# Patient Record
Sex: MALE | Race: ASIAN | Employment: OTHER | URBAN - METROPOLITAN AREA
[De-identification: names, ages, dates, MRNs, and addresses within clinical notes are randomized per-mention and may not be internally consistent; named-entity substitution may affect disease eponyms.]

---

## 2018-06-01 RX ORDER — CHOLECALCIFEROL TAB 125 MCG (5000 UNIT) 125 MCG
5000 TAB ORAL DAILY
COMMUNITY

## 2018-06-06 ENCOUNTER — ANESTHESIA EVENT (OUTPATIENT)
Dept: SURGERY | Age: 68
DRG: 614 | End: 2018-06-06
Payer: MEDICARE

## 2018-06-07 ENCOUNTER — HOSPITAL ENCOUNTER (INPATIENT)
Age: 68
LOS: 2 days | Discharge: HOME | DRG: 614 | End: 2018-06-09
Attending: UROLOGY | Admitting: UROLOGY
Payer: MEDICARE

## 2018-06-07 ENCOUNTER — ANESTHESIA (OUTPATIENT)
Dept: SURGERY | Age: 68
DRG: 614 | End: 2018-06-07
Payer: MEDICARE

## 2018-06-07 PROBLEM — R31.9 HEMATURIA: Status: ACTIVE | Noted: 2018-06-07

## 2018-06-07 PROBLEM — E26.9 HYPERALDOSTERONISM (HCC): Status: ACTIVE | Noted: 2018-06-07

## 2018-06-07 LAB
ABO + RH BLD: NORMAL
ANION GAP SERPL CALC-SCNC: 11 MMOL/L (ref 3–18)
BLOOD GROUP ANTIBODIES SERPL: NORMAL
BUN SERPL-MCNC: 38 MG/DL (ref 7–18)
BUN/CREAT SERPL: 16 (ref 12–20)
CALCIUM SERPL-MCNC: 8.3 MG/DL (ref 8.5–10.1)
CHLORIDE SERPL-SCNC: 111 MMOL/L (ref 100–108)
CO2 SERPL-SCNC: 22 MMOL/L (ref 21–32)
CREAT SERPL-MCNC: 2.32 MG/DL (ref 0.6–1.3)
ERYTHROCYTE [DISTWIDTH] IN BLOOD BY AUTOMATED COUNT: 14.7 % (ref 11.6–14.5)
GLUCOSE SERPL-MCNC: 146 MG/DL (ref 74–99)
HCT VFR BLD AUTO: 38.9 % (ref 36–48)
HGB BLD-MCNC: 13.3 G/DL (ref 13–16)
MCH RBC QN AUTO: 30 PG (ref 24–34)
MCHC RBC AUTO-ENTMCNC: 34.2 G/DL (ref 31–37)
MCV RBC AUTO: 87.8 FL (ref 74–97)
PLATELET # BLD AUTO: 228 K/UL (ref 135–420)
PMV BLD AUTO: 10.8 FL (ref 9.2–11.8)
POTASSIUM SERPL-SCNC: 3.7 MMOL/L (ref 3.5–5.5)
RBC # BLD AUTO: 4.43 M/UL (ref 4.7–5.5)
SODIUM SERPL-SCNC: 144 MMOL/L (ref 136–145)
SPECIMEN EXP DATE BLD: NORMAL
WBC # BLD AUTO: 13.5 K/UL (ref 4.6–13.2)

## 2018-06-07 PROCEDURE — 77030011640 HC PAD GRND REM COVD -A: Performed by: UROLOGY

## 2018-06-07 PROCEDURE — 74011250636 HC RX REV CODE- 250/636: Performed by: UROLOGY

## 2018-06-07 PROCEDURE — 77030002966 HC SUT PDS J&J -A: Performed by: UROLOGY

## 2018-06-07 PROCEDURE — 74011000250 HC RX REV CODE- 250: Performed by: UROLOGY

## 2018-06-07 PROCEDURE — 77030009848 HC PASSR SUT SET COOP -C: Performed by: UROLOGY

## 2018-06-07 PROCEDURE — 77030005401 HC CATH RAD ARRO -A: Performed by: ANESTHESIOLOGY

## 2018-06-07 PROCEDURE — 74011250637 HC RX REV CODE- 250/637: Performed by: UROLOGY

## 2018-06-07 PROCEDURE — 86901 BLOOD TYPING SEROLOGIC RH(D): CPT | Performed by: UROLOGY

## 2018-06-07 PROCEDURE — 76010000878 HC OR TIME 3 TO 3.5HR INTENSV - TIER 2: Performed by: UROLOGY

## 2018-06-07 PROCEDURE — 76060000037 HC ANESTHESIA 3 TO 3.5 HR: Performed by: UROLOGY

## 2018-06-07 PROCEDURE — 74011250636 HC RX REV CODE- 250/636

## 2018-06-07 PROCEDURE — 74011250636 HC RX REV CODE- 250/636: Performed by: NURSE ANESTHETIST, CERTIFIED REGISTERED

## 2018-06-07 PROCEDURE — 8E0W4CZ ROBOTIC ASSISTED PROCEDURE OF TRUNK REGION, PERCUTANEOUS ENDOSCOPIC APPROACH: ICD-10-PCS | Performed by: UROLOGY

## 2018-06-07 PROCEDURE — 77030002933 HC SUT MCRYL J&J -A: Performed by: UROLOGY

## 2018-06-07 PROCEDURE — 77030014647 HC SEAL FBRN TISSL BAXT -D: Performed by: UROLOGY

## 2018-06-07 PROCEDURE — 77030031139 HC SUT VCRL2 J&J -A: Performed by: UROLOGY

## 2018-06-07 PROCEDURE — 77030008602 HC TRCR ENDOSC EPATH J&J -B: Performed by: UROLOGY

## 2018-06-07 PROCEDURE — 77030036583 HC TRCR CANN BLDLSS OPT MEDT -B: Performed by: UROLOGY

## 2018-06-07 PROCEDURE — 85027 COMPLETE CBC AUTOMATED: CPT | Performed by: UROLOGY

## 2018-06-07 PROCEDURE — 77030008477 HC STYL SATN SLP COVD -A: Performed by: ANESTHESIOLOGY

## 2018-06-07 PROCEDURE — 74011250637 HC RX REV CODE- 250/637: Performed by: FAMILY MEDICINE

## 2018-06-07 PROCEDURE — 77030022473 HC HNDL ENDO GIA UNIV USDA -C: Performed by: UROLOGY

## 2018-06-07 PROCEDURE — 74011250637 HC RX REV CODE- 250/637

## 2018-06-07 PROCEDURE — 77030013079 HC BLNKT BAIR HGGR 3M -A: Performed by: ANESTHESIOLOGY

## 2018-06-07 PROCEDURE — 77030008683 HC TU ET CUF COVD -A: Performed by: ANESTHESIOLOGY

## 2018-06-07 PROCEDURE — 76210000017 HC OR PH I REC 1.5 TO 2 HR: Performed by: UROLOGY

## 2018-06-07 PROCEDURE — 77030010935 HC CLP LIG ABSRB TELE -B: Performed by: UROLOGY

## 2018-06-07 PROCEDURE — 74011000250 HC RX REV CODE- 250: Performed by: FAMILY MEDICINE

## 2018-06-07 PROCEDURE — 65270000029 HC RM PRIVATE

## 2018-06-07 PROCEDURE — 77030008771 HC TU NG SALEM SUMP -A: Performed by: ANESTHESIOLOGY

## 2018-06-07 PROCEDURE — 77030018842 HC SOL IRR SOD CL 9% BAXT -A: Performed by: UROLOGY

## 2018-06-07 PROCEDURE — 74011000250 HC RX REV CODE- 250

## 2018-06-07 PROCEDURE — 77030016151 HC PROTCTR LNS DFOG COVD -B: Performed by: UROLOGY

## 2018-06-07 PROCEDURE — 77030010939 HC CLP LIG TELE -B: Performed by: UROLOGY

## 2018-06-07 PROCEDURE — 77030021508 HC STPLR ENDO GIA COVD -C: Performed by: UROLOGY

## 2018-06-07 PROCEDURE — 80048 BASIC METABOLIC PNL TOTAL CA: CPT | Performed by: UROLOGY

## 2018-06-07 PROCEDURE — 77030034850: Performed by: UROLOGY

## 2018-06-07 PROCEDURE — 77030035277 HC OBTRTR BLDELSS DISP INTU -B: Performed by: UROLOGY

## 2018-06-07 PROCEDURE — 77030010517 HC APPL SEAL FLOSEL BAXT -B: Performed by: UROLOGY

## 2018-06-07 PROCEDURE — 03HY32Z INSERTION OF MONITORING DEVICE INTO UPPER ARTERY, PERCUTANEOUS APPROACH: ICD-10-PCS | Performed by: ANESTHESIOLOGY

## 2018-06-07 PROCEDURE — 77030032490 HC SLV COMPR SCD KNE COVD -B: Performed by: UROLOGY

## 2018-06-07 PROCEDURE — 77030018836 HC SOL IRR NACL ICUM -A: Performed by: UROLOGY

## 2018-06-07 PROCEDURE — 36415 COLL VENOUS BLD VENIPUNCTURE: CPT | Performed by: UROLOGY

## 2018-06-07 PROCEDURE — 77030010507 HC ADH SKN DERMBND J&J -B: Performed by: UROLOGY

## 2018-06-07 PROCEDURE — 77030026918 HC ADMN ST IV BLD BD -A: Performed by: ANESTHESIOLOGY

## 2018-06-07 PROCEDURE — 88307 TISSUE EXAM BY PATHOLOGIST: CPT | Performed by: UROLOGY

## 2018-06-07 PROCEDURE — 77030008462 HC STPLR SKN PROX J&J -A: Performed by: UROLOGY

## 2018-06-07 PROCEDURE — 77030008603 HC TRCR ENDOSC EPATH J&J -C: Performed by: UROLOGY

## 2018-06-07 PROCEDURE — 77030012022 HC APPL CLP ENDOSC COVD -C: Performed by: UROLOGY

## 2018-06-07 PROCEDURE — 0GT24ZZ RESECTION OF LEFT ADRENAL GLAND, PERCUTANEOUS ENDOSCOPIC APPROACH: ICD-10-PCS | Performed by: UROLOGY

## 2018-06-07 PROCEDURE — 77030013797 HC KT TRNSDUC PRSSR EDWD -A: Performed by: ANESTHESIOLOGY

## 2018-06-07 RX ORDER — OXYBUTYNIN CHLORIDE 5 MG/1
5 TABLET ORAL
Status: DISCONTINUED | OUTPATIENT
Start: 2018-06-07 | End: 2018-06-09 | Stop reason: HOSPADM

## 2018-06-07 RX ORDER — FAMOTIDINE 20 MG/1
TABLET, FILM COATED ORAL
Status: COMPLETED
Start: 2018-06-07 | End: 2018-06-07

## 2018-06-07 RX ORDER — LIDOCAINE HYDROCHLORIDE 20 MG/ML
INJECTION, SOLUTION EPIDURAL; INFILTRATION; INTRACAUDAL; PERINEURAL AS NEEDED
Status: DISCONTINUED | OUTPATIENT
Start: 2018-06-07 | End: 2018-06-07 | Stop reason: HOSPADM

## 2018-06-07 RX ORDER — FENTANYL CITRATE 50 UG/ML
INJECTION, SOLUTION INTRAMUSCULAR; INTRAVENOUS AS NEEDED
Status: DISCONTINUED | OUTPATIENT
Start: 2018-06-07 | End: 2018-06-07 | Stop reason: HOSPADM

## 2018-06-07 RX ORDER — HEPARIN SODIUM 5000 [USP'U]/ML
5000 INJECTION, SOLUTION INTRAVENOUS; SUBCUTANEOUS ONCE
Status: COMPLETED | OUTPATIENT
Start: 2018-06-07 | End: 2018-06-07

## 2018-06-07 RX ORDER — ONDANSETRON 2 MG/ML
INJECTION INTRAMUSCULAR; INTRAVENOUS AS NEEDED
Status: DISCONTINUED | OUTPATIENT
Start: 2018-06-07 | End: 2018-06-07 | Stop reason: HOSPADM

## 2018-06-07 RX ORDER — HYDROMORPHONE HYDROCHLORIDE 2 MG/ML
INJECTION, SOLUTION INTRAMUSCULAR; INTRAVENOUS; SUBCUTANEOUS AS NEEDED
Status: DISCONTINUED | OUTPATIENT
Start: 2018-06-07 | End: 2018-06-07 | Stop reason: HOSPADM

## 2018-06-07 RX ORDER — PROPOFOL 10 MG/ML
INJECTION, EMULSION INTRAVENOUS AS NEEDED
Status: DISCONTINUED | OUTPATIENT
Start: 2018-06-07 | End: 2018-06-07 | Stop reason: HOSPADM

## 2018-06-07 RX ORDER — NEOSTIGMINE METHYLSULFATE 5 MG/5 ML
SYRINGE (ML) INTRAVENOUS AS NEEDED
Status: DISCONTINUED | OUTPATIENT
Start: 2018-06-07 | End: 2018-06-07 | Stop reason: HOSPADM

## 2018-06-07 RX ORDER — HYDRALAZINE HYDROCHLORIDE 20 MG/ML
20 INJECTION INTRAMUSCULAR; INTRAVENOUS
Status: DISCONTINUED | OUTPATIENT
Start: 2018-06-07 | End: 2018-06-09 | Stop reason: HOSPADM

## 2018-06-07 RX ORDER — ONDANSETRON 2 MG/ML
4 INJECTION INTRAMUSCULAR; INTRAVENOUS ONCE
Status: DISCONTINUED | OUTPATIENT
Start: 2018-06-07 | End: 2018-06-07 | Stop reason: HOSPADM

## 2018-06-07 RX ORDER — EPLERENONE 25 MG/1
75 TABLET, FILM COATED ORAL 2 TIMES DAILY
Status: CANCELLED | OUTPATIENT
Start: 2018-06-07

## 2018-06-07 RX ORDER — HYDRALAZINE HYDROCHLORIDE 20 MG/ML
INJECTION INTRAMUSCULAR; INTRAVENOUS AS NEEDED
Status: DISCONTINUED | OUTPATIENT
Start: 2018-06-07 | End: 2018-06-07 | Stop reason: HOSPADM

## 2018-06-07 RX ORDER — BUPIVACAINE HYDROCHLORIDE 2.5 MG/ML
INJECTION, SOLUTION EPIDURAL; INFILTRATION; INTRACAUDAL AS NEEDED
Status: DISCONTINUED | OUTPATIENT
Start: 2018-06-07 | End: 2018-06-07 | Stop reason: HOSPADM

## 2018-06-07 RX ORDER — GUANFACINE 2 MG/1
2 TABLET, EXTENDED RELEASE ORAL DAILY
COMMUNITY

## 2018-06-07 RX ORDER — FENTANYL CITRATE 50 UG/ML
50 INJECTION, SOLUTION INTRAMUSCULAR; INTRAVENOUS AS NEEDED
Status: DISCONTINUED | OUTPATIENT
Start: 2018-06-07 | End: 2018-06-07 | Stop reason: HOSPADM

## 2018-06-07 RX ORDER — ONDANSETRON 2 MG/ML
4 INJECTION INTRAMUSCULAR; INTRAVENOUS
Status: DISCONTINUED | OUTPATIENT
Start: 2018-06-07 | End: 2018-06-09 | Stop reason: HOSPADM

## 2018-06-07 RX ORDER — SODIUM CHLORIDE 0.9 % (FLUSH) 0.9 %
5-10 SYRINGE (ML) INJECTION EVERY 8 HOURS
Status: DISCONTINUED | OUTPATIENT
Start: 2018-06-07 | End: 2018-06-09 | Stop reason: HOSPADM

## 2018-06-07 RX ORDER — NALOXONE HYDROCHLORIDE 0.4 MG/ML
0.4 INJECTION, SOLUTION INTRAMUSCULAR; INTRAVENOUS; SUBCUTANEOUS AS NEEDED
Status: DISCONTINUED | OUTPATIENT
Start: 2018-06-07 | End: 2018-06-09 | Stop reason: HOSPADM

## 2018-06-07 RX ORDER — HYDROMORPHONE HYDROCHLORIDE 1 MG/ML
1 INJECTION, SOLUTION INTRAMUSCULAR; INTRAVENOUS; SUBCUTANEOUS
Status: DISCONTINUED | OUTPATIENT
Start: 2018-06-07 | End: 2018-06-09 | Stop reason: HOSPADM

## 2018-06-07 RX ORDER — SODIUM CHLORIDE, SODIUM LACTATE, POTASSIUM CHLORIDE, CALCIUM CHLORIDE 600; 310; 30; 20 MG/100ML; MG/100ML; MG/100ML; MG/100ML
50 INJECTION, SOLUTION INTRAVENOUS CONTINUOUS
Status: DISCONTINUED | OUTPATIENT
Start: 2018-06-08 | End: 2018-06-08

## 2018-06-07 RX ORDER — SODIUM CHLORIDE 0.9 % (FLUSH) 0.9 %
5-10 SYRINGE (ML) INJECTION AS NEEDED
Status: DISCONTINUED | OUTPATIENT
Start: 2018-06-07 | End: 2018-06-09 | Stop reason: HOSPADM

## 2018-06-07 RX ORDER — OXYCODONE AND ACETAMINOPHEN 5; 325 MG/1; MG/1
1-2 TABLET ORAL
Qty: 30 TAB | Refills: 0 | Status: SHIPPED | OUTPATIENT
Start: 2018-06-07 | End: 2018-06-07

## 2018-06-07 RX ORDER — ACETAMINOPHEN 325 MG/1
650 TABLET ORAL
Status: DISCONTINUED | OUTPATIENT
Start: 2018-06-07 | End: 2018-06-09 | Stop reason: HOSPADM

## 2018-06-07 RX ORDER — NIFEDIPINE 60 MG/1
90 TABLET, EXTENDED RELEASE ORAL DAILY
Status: CANCELLED | OUTPATIENT
Start: 2018-06-08

## 2018-06-07 RX ORDER — DOCUSATE SODIUM 100 MG/1
100 CAPSULE, LIQUID FILLED ORAL 2 TIMES DAILY
Qty: 30 CAP | Refills: 0 | Status: SHIPPED | OUTPATIENT
Start: 2018-06-07 | End: 2018-06-22

## 2018-06-07 RX ORDER — DIPHENHYDRAMINE HYDROCHLORIDE 50 MG/ML
12.5 INJECTION, SOLUTION INTRAMUSCULAR; INTRAVENOUS
Status: DISCONTINUED | OUTPATIENT
Start: 2018-06-07 | End: 2018-06-09 | Stop reason: HOSPADM

## 2018-06-07 RX ORDER — SODIUM CHLORIDE 9 MG/ML
INJECTION, SOLUTION INTRAVENOUS
Status: DISCONTINUED | OUTPATIENT
Start: 2018-06-07 | End: 2018-06-07 | Stop reason: HOSPADM

## 2018-06-07 RX ORDER — DOCUSATE SODIUM 100 MG/1
100 CAPSULE, LIQUID FILLED ORAL 2 TIMES DAILY
Qty: 30 CAP | Refills: 0 | Status: SHIPPED | OUTPATIENT
Start: 2018-06-07 | End: 2018-06-07

## 2018-06-07 RX ORDER — HYDROMORPHONE HYDROCHLORIDE 2 MG/ML
0.5 INJECTION, SOLUTION INTRAMUSCULAR; INTRAVENOUS; SUBCUTANEOUS
Status: DISCONTINUED | OUTPATIENT
Start: 2018-06-07 | End: 2018-06-07 | Stop reason: HOSPADM

## 2018-06-07 RX ORDER — MIDAZOLAM HYDROCHLORIDE 1 MG/ML
INJECTION, SOLUTION INTRAMUSCULAR; INTRAVENOUS AS NEEDED
Status: DISCONTINUED | OUTPATIENT
Start: 2018-06-07 | End: 2018-06-07 | Stop reason: HOSPADM

## 2018-06-07 RX ORDER — SUCCINYLCHOLINE CHLORIDE 20 MG/ML
INJECTION INTRAMUSCULAR; INTRAVENOUS AS NEEDED
Status: DISCONTINUED | OUTPATIENT
Start: 2018-06-07 | End: 2018-06-07 | Stop reason: HOSPADM

## 2018-06-07 RX ORDER — GLYCOPYRROLATE 0.2 MG/ML
INJECTION INTRAMUSCULAR; INTRAVENOUS AS NEEDED
Status: DISCONTINUED | OUTPATIENT
Start: 2018-06-07 | End: 2018-06-07 | Stop reason: HOSPADM

## 2018-06-07 RX ORDER — OXYCODONE AND ACETAMINOPHEN 5; 325 MG/1; MG/1
1-2 TABLET ORAL
Qty: 30 TAB | Refills: 0 | Status: SHIPPED | OUTPATIENT
Start: 2018-06-07 | End: 2021-02-09

## 2018-06-07 RX ORDER — OXYCODONE AND ACETAMINOPHEN 5; 325 MG/1; MG/1
1 TABLET ORAL
Status: DISCONTINUED | OUTPATIENT
Start: 2018-06-07 | End: 2018-06-09 | Stop reason: HOSPADM

## 2018-06-07 RX ORDER — LABETALOL HCL 20 MG/4 ML
SYRINGE (ML) INTRAVENOUS AS NEEDED
Status: DISCONTINUED | OUTPATIENT
Start: 2018-06-07 | End: 2018-06-07 | Stop reason: HOSPADM

## 2018-06-07 RX ORDER — HEPARIN SODIUM 5000 [USP'U]/ML
5000 INJECTION, SOLUTION INTRAVENOUS; SUBCUTANEOUS EVERY 8 HOURS
Status: DISCONTINUED | OUTPATIENT
Start: 2018-06-07 | End: 2018-06-09 | Stop reason: HOSPADM

## 2018-06-07 RX ORDER — CLINDAMYCIN PHOSPHATE 900 MG/50ML
900 INJECTION INTRAVENOUS ONCE
Status: COMPLETED | OUTPATIENT
Start: 2018-06-07 | End: 2018-06-07

## 2018-06-07 RX ORDER — AMLODIPINE BESYLATE 5 MG/1
5 TABLET ORAL DAILY
Status: DISCONTINUED | OUTPATIENT
Start: 2018-06-07 | End: 2018-06-08

## 2018-06-07 RX ORDER — VECURONIUM BROMIDE FOR INJECTION 1 MG/ML
INJECTION, POWDER, LYOPHILIZED, FOR SOLUTION INTRAVENOUS AS NEEDED
Status: DISCONTINUED | OUTPATIENT
Start: 2018-06-07 | End: 2018-06-07 | Stop reason: HOSPADM

## 2018-06-07 RX ORDER — SODIUM CHLORIDE, SODIUM LACTATE, POTASSIUM CHLORIDE, CALCIUM CHLORIDE 600; 310; 30; 20 MG/100ML; MG/100ML; MG/100ML; MG/100ML
50 INJECTION, SOLUTION INTRAVENOUS CONTINUOUS
Status: DISCONTINUED | OUTPATIENT
Start: 2018-06-07 | End: 2018-06-07 | Stop reason: HOSPADM

## 2018-06-07 RX ORDER — FAMOTIDINE 20 MG/1
20 TABLET, FILM COATED ORAL ONCE
Status: COMPLETED | OUTPATIENT
Start: 2018-06-08 | End: 2018-06-07

## 2018-06-07 RX ADMIN — GLYCOPYRROLATE 0.2 MG: 0.2 INJECTION INTRAMUSCULAR; INTRAVENOUS at 11:59

## 2018-06-07 RX ADMIN — Medication 5 MG: at 11:59

## 2018-06-07 RX ADMIN — Medication 10 ML: at 16:57

## 2018-06-07 RX ADMIN — HYDRALAZINE HYDROCHLORIDE 4 MG: 20 INJECTION INTRAMUSCULAR; INTRAVENOUS at 11:53

## 2018-06-07 RX ADMIN — HYDROMORPHONE HYDROCHLORIDE 0.5 MG: 2 INJECTION INTRAMUSCULAR; INTRAVENOUS; SUBCUTANEOUS at 12:31

## 2018-06-07 RX ADMIN — HYDROMORPHONE HYDROCHLORIDE 0.5 MG: 2 INJECTION INTRAMUSCULAR; INTRAVENOUS; SUBCUTANEOUS at 12:51

## 2018-06-07 RX ADMIN — FENTANYL CITRATE 50 MCG: 50 INJECTION, SOLUTION INTRAMUSCULAR; INTRAVENOUS at 09:25

## 2018-06-07 RX ADMIN — FENTANYL CITRATE 50 MCG: 50 INJECTION, SOLUTION INTRAMUSCULAR; INTRAVENOUS at 11:06

## 2018-06-07 RX ADMIN — Medication 3 MG: at 11:55

## 2018-06-07 RX ADMIN — LIDOCAINE HYDROCHLORIDE 40 MG: 20 INJECTION, SOLUTION EPIDURAL; INFILTRATION; INTRACAUDAL; PERINEURAL at 08:50

## 2018-06-07 RX ADMIN — HEPARIN SODIUM 5000 UNITS: 5000 INJECTION, SOLUTION INTRAVENOUS; SUBCUTANEOUS at 16:52

## 2018-06-07 RX ADMIN — GLYCOPYRROLATE 0.4 MG: 0.2 INJECTION INTRAMUSCULAR; INTRAVENOUS at 11:55

## 2018-06-07 RX ADMIN — SUCCINYLCHOLINE CHLORIDE 100 MG: 20 INJECTION INTRAMUSCULAR; INTRAVENOUS at 08:50

## 2018-06-07 RX ADMIN — AMLODIPINE BESYLATE 5 MG: 5 TABLET ORAL at 21:22

## 2018-06-07 RX ADMIN — FAMOTIDINE 20 MG: 20 TABLET, FILM COATED ORAL at 07:52

## 2018-06-07 RX ADMIN — FENTANYL CITRATE 50 MCG: 50 INJECTION, SOLUTION INTRAMUSCULAR; INTRAVENOUS at 12:35

## 2018-06-07 RX ADMIN — PROPOFOL 150 MG: 10 INJECTION, EMULSION INTRAVENOUS at 08:50

## 2018-06-07 RX ADMIN — FAMOTIDINE 20 MG: 10 INJECTION INTRAVENOUS at 21:22

## 2018-06-07 RX ADMIN — MIDAZOLAM HYDROCHLORIDE 2 MG: 1 INJECTION, SOLUTION INTRAMUSCULAR; INTRAVENOUS at 08:43

## 2018-06-07 RX ADMIN — Medication 5 MG: at 11:50

## 2018-06-07 RX ADMIN — FAMOTIDINE 20 MG: 20 TABLET ORAL at 07:52

## 2018-06-07 RX ADMIN — Medication 5 MG: at 11:48

## 2018-06-07 RX ADMIN — SODIUM CHLORIDE: 9 INJECTION, SOLUTION INTRAVENOUS at 09:07

## 2018-06-07 RX ADMIN — Medication 5 MG: at 11:54

## 2018-06-07 RX ADMIN — SODIUM CHLORIDE, SODIUM LACTATE, POTASSIUM CHLORIDE, AND CALCIUM CHLORIDE: 600; 310; 30; 20 INJECTION, SOLUTION INTRAVENOUS at 11:54

## 2018-06-07 RX ADMIN — Medication 1 MG: at 11:59

## 2018-06-07 RX ADMIN — HYDROMORPHONE HYDROCHLORIDE 0.5 MG: 2 INJECTION, SOLUTION INTRAMUSCULAR; INTRAVENOUS; SUBCUTANEOUS at 09:42

## 2018-06-07 RX ADMIN — OXYCODONE HYDROCHLORIDE AND ACETAMINOPHEN 1 TABLET: 5; 325 TABLET ORAL at 20:28

## 2018-06-07 RX ADMIN — Medication 10 ML: at 21:23

## 2018-06-07 RX ADMIN — HYDROMORPHONE HYDROCHLORIDE 0.5 MG: 2 INJECTION, SOLUTION INTRAMUSCULAR; INTRAVENOUS; SUBCUTANEOUS at 10:35

## 2018-06-07 RX ADMIN — HYDRALAZINE HYDROCHLORIDE 4 MG: 20 INJECTION INTRAMUSCULAR; INTRAVENOUS at 11:45

## 2018-06-07 RX ADMIN — PROPOFOL 50 MG: 10 INJECTION, EMULSION INTRAVENOUS at 09:01

## 2018-06-07 RX ADMIN — ONDANSETRON 4 MG: 2 INJECTION INTRAMUSCULAR; INTRAVENOUS at 11:16

## 2018-06-07 RX ADMIN — VECURONIUM BROMIDE FOR INJECTION 5 MG: 1 INJECTION, POWDER, LYOPHILIZED, FOR SOLUTION INTRAVENOUS at 09:01

## 2018-06-07 RX ADMIN — SODIUM CHLORIDE, SODIUM LACTATE, POTASSIUM CHLORIDE, AND CALCIUM CHLORIDE 50 ML/HR: 600; 310; 30; 20 INJECTION, SOLUTION INTRAVENOUS at 07:41

## 2018-06-07 RX ADMIN — HYDROMORPHONE HYDROCHLORIDE 1 MG: 2 INJECTION, SOLUTION INTRAMUSCULAR; INTRAVENOUS; SUBCUTANEOUS at 12:09

## 2018-06-07 RX ADMIN — HYDRALAZINE HYDROCHLORIDE 20 MG: 20 INJECTION INTRAMUSCULAR; INTRAVENOUS at 20:29

## 2018-06-07 RX ADMIN — HEPARIN SODIUM 5000 UNITS: 5000 INJECTION, SOLUTION INTRAVENOUS; SUBCUTANEOUS at 07:51

## 2018-06-07 RX ADMIN — FENTANYL CITRATE 100 MCG: 50 INJECTION, SOLUTION INTRAMUSCULAR; INTRAVENOUS at 08:50

## 2018-06-07 RX ADMIN — VECURONIUM BROMIDE FOR INJECTION 2 MG: 1 INJECTION, POWDER, LYOPHILIZED, FOR SOLUTION INTRAVENOUS at 10:17

## 2018-06-07 RX ADMIN — CLINDAMYCIN PHOSPHATE 900 MG: 900 INJECTION INTRAVENOUS at 09:00

## 2018-06-07 RX ADMIN — Medication 10 ML: at 16:55

## 2018-06-07 NOTE — PROGRESS NOTES
TRANSFER - IN REPORT:    Verbal report received from Raya Sams RN(name) on Rufus Beaver  being received from [facility]) for routine progression of care      Report consisted of patients Situation, Background, Assessment and   Recommendations(SBAR). Information from the following report(s) SBAR, Kardex and MAR was reviewed with the receiving nurse. Opportunity for questions and clarification was provided. Assessment will be completed upon patients arrival to unit and care assumed. 1405 - received pt to room. Pt is very drowsy, easy to arouse. Pt rates pain 6/10 to abdomen, declines medication, states tolerable. Call bell at bedside. Wife at bedside. No distress noted. 1657 - observed pt resting quietly, wife at bedside    1840 - pt resting, no distress noted. No c/o of nausea vomiting, tolerated small amount of clears. Has not passed flatus.

## 2018-06-07 NOTE — IP AVS SNAPSHOT
[facility] 
 
 
 [address] [place] [address] Patient: Fabio Carranza MRN: [id number] GIP:[id number] About your hospitalization You were admitted on:  June 7, 2018 You last received care in the:  [address] [place] [address],[address] You were discharged on:  June 9, 2018 Why you were hospitalized Your primary diagnosis was:  Hyperaldosteronism (Hcc) Your diagnoses also included:  Hematuria Follow-up Information Follow up With Details Comments Contact Info Benjamin Johnston MD   [address] [facility] [address] [place], [place] [address] 
[phone] Your Scheduled Appointments Tuesday July 10, 2018  1:45 PM EDT  
ESTABLISHED PATIENT with Fabrice Mccartney MD  
Urology of [address] [address]  
[phone] Discharge Orders None A check karly indicates which time of day the medication should be taken. My Medications START taking these medications Instructions Each Dose to Equal  
 Morning Noon Evening Bedtime  
 docusate sodium 100 mg capsule Commonly known as:  Kasie Officer Your last dose was: Your next dose is: Take 1 Cap by mouth two (2) times a day for 15 days. 100 mg  
    
   
   
   
  
 oxyCODONE-acetaminophen 5-325 mg per tablet Commonly known as:  PERCOCET Your last dose was: Your next dose is: Take 1-2 Tabs by mouth every four (4) hours as needed for Pain. Max Daily Amount: 12 Tabs. 1-2 Tab CONTINUE taking these medications Instructions Each Dose to Equal  
 Morning Noon Evening Bedtime COZAAR 50 mg tablet Generic drug:  losartan Your last dose was: Your next dose is: Take 100 mg by mouth daily. 100 mg  
    
   
   
   
  
 eplerenone 25 mg tablet Commonly known as:  José Miguel Jones Your last dose was: Your next dose is: Take 75 mg by mouth two (2) times a day. 75 mg  
    
   
   
   
  
 guanFACINE ER 2 mg ER tablet Commonly known as:  INTUNIV Your last dose was: Your next dose is: Take 2 mg by mouth daily. 2 mg  
    
   
   
   
  
 multivitamin tablet Commonly known as:  ONE A DAY Your last dose was: Your next dose is: Take 1 Tab by mouth daily. 1 Tab NIFEdipine ER 60 mg ER tablet Commonly known as:  ADALAT CC Your last dose was: Your next dose is: Take 90 mg by mouth daily. 90 mg  
    
   
   
   
  
 potassium chloride 20 mEq tablet Commonly known as:  K-DUR, KLOR-CON Your last dose was: Your next dose is: Take 20 mEq by mouth daily. 20 mEq VITAMIN B COMPLEX PO Your last dose was: Your next dose is: Take  by mouth. VITAMIN D3 5,000 unit Tab tablet Generic drug:  cholecalciferol (VITAMIN D3) Your last dose was: Your next dose is: Take 5,000 Units by mouth daily. 5000 Units Where to Get Your Medications Information on where to get these meds will be given to you by the nurse or doctor. ! Ask your nurse or doctor about these medications  
  docusate sodium 100 mg capsule  
 oxyCODONE-acetaminophen 5-325 mg per tablet Opioid Education Prescription Opioids: What You Need to Know: 
 
 
 
F-face looks uneven A-arms unable to move or move unevenly S-speech slurred or non-existent T-time-call 911 as soon as signs and symptoms begin-DO NOT go Back to bed or wait to see if you get better-TIME IS BRAIN. Warning Signs of HEART ATTACK Call 911 if you have these symptoms: 
? Chest discomfort. Most heart attacks involve discomfort in the center of the chest that lasts more than a few minutes, or that goes away and comes back. It can feel like uncomfortable pressure, squeezing, fullness, or pain. ? Discomfort in other areas of the upper body. Symptoms can include pain or discomfort in one or both arms, the back, neck, jaw, or stomach. ? Shortness of breath with or without chest discomfort. ? Other signs may include breaking out in a cold sweat, nausea, or lightheadedness. Don't wait more than five minutes to call [address]! Fast action can save your life. Calling 911 is almost always the fastest way to get lifesaving treatment. Emergency Medical Services staff can begin treatment when they arrive  up to an hour sooner than if someone gets to the hospital by car. Patient armband removed and shredded MyChart Activation Thank you for requesting access to [facility]. Please follow the instructions below to securely access and download your online medical record. [facility] allows you to send messages to your doctor, view your test results, renew your prescriptions, schedule appointments, and more. How Do I Sign Up? 1. In your internet browser, go to www.[email or web address] 
2. Click on the First Time User? Click Here link in the Sign In box. You will be redirect to the New Member Sign Up page. 3. Enter your [facility] Access Code exactly as it appears below.  You will not need to use this code after youve completed the sign-up process. If you do not sign up before the expiration date, you must request a new code. [employer] Access Code: [email or web address] Expires: 2018  4:36 PM (This is the date your [employer] access code will ) 4. Enter the last four digits of your Social Security Number (xxxx) and Date of Birth (mm/dd/yyyy) as indicated and click Submit. You will be taken to the next sign-up page. 5. Create a [employer] ID. This will be your [employer] login ID and cannot be changed, so think of one that is secure and easy to remember. 6. Create a [employer] password. You can change your password at any time. 7. Enter your Password Reset Question and Answer. This can be used at a later time if you forget your password. 8. Enter your e-mail address. You will receive e-mail notification when new information is available in [address] 9. Click Sign Up. You can now view and download portions of your medical record. 10. Click the Download Summary menu link to download a portable copy of your medical information. Additional Information If you have questions, please visit the Frequently Asked Questions section of the [employer] website at [email or web address] Remember, [employer] is NOT to be used for urgent needs. For medical emergencies, dial 911. The discharge information has been reviewed with the patient and spouse. The patient and spouse verbalized understanding. Discharge medications reviewed with the patient and spouse and appropriate educational materials and side effects teaching were provided. ___________________________________________________________________________________________________________________________________ MyChart Announcement We are excited to announce that we are making your provider's discharge notes available to you in MyChart.   You will see these notes when they are completed and signed by the physician that discharged you from your recent hospital stay. If you have any questions or concerns about any information you see in [employer], please call the Health Information Department where you were seen or reach out to your Primary Care Provider for more information about your plan of care. Introducing [facility] & [facility] SERVICES! Emir Nancy introduces [employer] patient portal. Now you can access parts of your medical record, email your doctor's office, and request medication refills online. 1. In your internet browser, go to [email or web address] 2. Click on the First Time User? Click Here link in the Sign In box. You will see the New Member Sign Up page. 3. Enter your [employer] Access Code exactly as it appears below. You will not need to use this code after youve completed the sign-up process. If you do not sign up before the expiration date, you must request a new code. · [employer] Access Code: [email or web address] Expires: 9/2/2018  4:36 PM 
 
4. Enter the last four digits of your Social Security Number (xxxx) and Date of Birth (mm/dd/yyyy) as indicated and click Submit. You will be taken to the next sign-up page. 5. Create a [employer] ID. This will be your [employer] login ID and cannot be changed, so think of one that is secure and easy to remember. 6. Create a [employer] password. You can change your password at any time. 7. Enter your Password Reset Question and Answer. This can be used at a later time if you forget your password. 8. Enter your e-mail address. You will receive e-mail notification when new information is available in [address] 9. Click Sign Up. You can now view and download portions of your medical record. 10. Click the Download Summary menu link to download a portable copy of your medical information.  
 
If you have questions, please visit the Frequently Asked Questions section of the [employer]. Remember, MyChart is NOT to be used for urgent needs. For medical emergencies, dial 911. Now available from your iPhone and Android! Introducing Elfego Kulkarni As a [facility] P[facility]ano patient, I wanted to make you aware of our electronic visit tool called [employer] Kulkarni. [facility] 24/7 allows you to connect within minutes with a medical provider 24 hours a day, seven days a week via a mobile device or tablet or logging into a secure website from your computer. You can access Elfego Kulkarni from anywhere in the [place]. A virtual visit might be right for you when you have a simple condition and feel like you just dont want to get out of bed, or cant get away from work for an appointment, when your regular [facility] Pisano provider is not available (evenings, weekends or holidays), or when youre out of town and need minor care. Electronic visits cost only $49 and if the Tanikapretty Pisano 24/7 provider determines a prescription is needed to treat your condition, one can be electronically transmitted to a nearby pharmacy*. Please take a moment to enroll today if you have not already done so. The enrollment process is free and takes just a few minutes. To enroll, please download the [employer] 24/7 kishor to your tablet or phone, or visit [email or web address] to enroll on your computer. And, as an [address] [place] [address] patient with a [employer] account, the results of your visits will be scanned into your electronic medical record and your primary care provider will be able to view the scanned results. We urge you to continue to see your regular [facility]ika Pisano provider for your ongoing medical care. And while your primary care provider may not be the one available when you seek a Elfego Kulkarni virtual visit, the peace of mind you get from getting a real diagnosis real time can be priceless. For more information on Elfego Kulkarni, view our Frequently Asked Questions (FAQs) at [email or web address]. Sincerely, 
 
Elkin Shaffer MD 
Chief Medical Officer [address] *:  certain medications cannot be prescribed via Elfego Kulkarni Providers Seen During Your Hospitalization Provider Specialty Primary office phone Jimi Espinosa MD Urology [phone] Your Primary Care Physician (PCP) Primary Care Physician Office Phone Office Fax Ajay Matamoros [phone] You are allergic to the following Allergen Reactions Flomax (Tamsulosin) Anaphylaxis Angioedema Nasonex (Mometasone) Swelling Penicillins Rash Itching Mild Recent Documentation Height Weight BMI Smoking Status 1.702 m 67.3 kg 23.24 kg/m2 Current Every Day Smoker Emergency Contacts Name Discharge Info Relation Home Work Mobile [facility] DISCHARGE CAREGIVER [3] Spouse [3] [phone] Patient Belongings The following personal items are in your possession at time of discharge: 
  Dental Appliances: None  Visual Aid: Glasses      Home Medications: None   Jewelry: None  Clothing: Pants, Shirt, None ([place] to wife) Please provide this summary of care documentation to your next provider. Signatures-by signing, you are acknowledging that this After Visit Summary has been reviewed with you and you have received a copy. Patient Signature:  ____________________________________________________________ Date:  ____________________________________________________________  
  
Amado Korey Provider Signature:  ____________________________________________________________ Date:  ____________________________________________________________

## 2018-06-07 NOTE — ANESTHESIA POSTPROCEDURE EVALUATION
Post-Anesthesia Evaluation and Assessment    Patient: Nicholas Ronquillo MRN: [id number]  SSN: [id number]    YOB: 1950  Age: 74 y.o. Sex: male      Data from PACU flowsheet    Cardiovascular Function/Vital Signs  Visit Vitals    /82    Pulse 75    Temp 35.8 °C (96.4 °F)    Resp 15    Ht 5' 7\" (1.702 m)    Wt 67.3 kg (148 lb 6.4 oz)    SpO2 96%    BMI 23.24 kg/m2       Patient is status post general anesthesia for Procedure(s):  davainci left ADRENALECTOMY ROBOTIC ASSISTED. Nausea/Vomiting: controlled    Postoperative hydration reviewed and adequate. Pain:  Pain Scale 1:  (medicated per CRNA upon arrival to PACU) (06/07/18 1202)  Pain Intensity 1: 0 (06/07/18 0721)   Managed      Mental Status and Level of Consciousness: Alert and oriented     Pulmonary Status:   O2 Device: Room air (06/07/18 1228)   Adequate oxygenation and airway patent    Complications related to anesthesia: None    Post-anesthesia assessment completed.  No concerns    Signed By: Baylee Gary MD     June 7, 2018

## 2018-06-07 NOTE — PERIOP NOTES
1202  Patient received in PACU and connected to monitors. Vital signs stable. RN at bedside. Will continue to monitor. 1205  Medicated per CRNA for c/o pain. 1215  Blood work obtained via A-line for ordered labwork. 1220  Family updated per Dr. Boogie. 1223  A-line removed with tip intact and pressure dressing applied. [address] pt for c/o pain per MAR.    1325  Pt resting with eyes closed, no distress noted. [facility]. 1348  Updated pt's wife with room #2204.    ===================================================    1350  TRANSFER - OUT REPORT:    Verbal report given to Blanca Perez RN(name) on Fabio Carranza  being transferred to 220(unit) for routine post - op       Report consisted of patients Situation, Background, Assessment and   Recommendations(SBAR). Information from the following report(s) SBAR, Kardex, OR Summary, Intake/Output and MAR was reviewed with the receiving nurse. Lines:   Peripheral IV 06/07/18 Left Arm (Active)   Site Assessment Clean, dry, & intact 6/7/2018 12:02 PM   Phlebitis Assessment 0 6/7/2018 12:02 PM   Infiltration Assessment 0 6/7/2018 12:02 PM   Dressing Status Clean, dry, & intact 6/7/2018 12:02 PM   Dressing Type Transparent;Tape 6/7/2018 12:02 PM   Hub Color/Line Status Pink; Infusing 6/7/2018 12:02 PM   Alcohol Cap Used Yes 6/7/2018  7:38 AM       Peripheral IV 06/07/18 Right Hand (Active)   Site Assessment Clean, dry, & intact 6/7/2018 12:02 PM   Phlebitis Assessment 0 6/7/2018 12:02 PM   Infiltration Assessment 0 6/7/2018 12:02 PM   Dressing Status Clean, dry, & intact 6/7/2018 12:02 PM   Dressing Type Transparent;Tape 6/7/2018 12:02 PM   Hub Color/Line Status Pink;Capped 6/7/2018 12:02 PM        Opportunity for questions and clarification was provided.       Patient transported with:   [employer]

## 2018-06-07 NOTE — H&P
History and physical review. The patient has been examined. There have been no significant clinical changes. NAD, CTAB, RRR    Left Side marked  Clinda On call to [address] [place] [address] to proceed with L RA adrenalectomy        UROLOGY NEW CONSULT           Encounter Diagnoses       ICD-10-CM ICD-9-CM   1. Hyper[facility] ([facility] E26.9 255.10   2. Hematuria, unspecified type R31.9 599.70         Assessment:  1. Pt is a 74 y.o. male with primary hyperaldosteronism. In 2010, aldosterone/renin ratio elevated at 44/0.89=116 (normal <30). 24 hour urine for aldosterone on 8/31/10 was 31 mcg/24 hours (normal <17) after 5 days on high sodium diet. CT adrenals with thin sections on 9/30/10 did not show any adenoma. CT abdomen 5/22/15 revealed thickening of lateral limb of left adrenal gland. Selective adrenal vein sampling by Dr. Juan Ramon Geronimo on 1/26/17 revealed \"cortisol >634\" and the gradient from left to right adrenal for aldosterone was greater than 50 fold, confirming the left adrenal gland is the source of hyperaldosteronism. CT A/P 9/5/17 showed stable enlarged left adrenal gland. 2. Kidney stones with associated hematuria. S/p URS/HLL by Dr. Darryl De Leon in 2009. 3. BPH  4. CKD  5. HTN  6. GERD     Plan:  · Reviewed most recent CT report with patient; stable enlarged left adrenal gland. · Reviewed adrenal vein sampling and aldosterone/renin ratio results with patient. · Discussed left robotic-assisted laparoscopic adrenalectomy in detail. · Reviewed risks, benefits, and SE associated with the procedure. · Pre-op labs, EKG, and CXR ordered. · Patient needs medical clearance from PCP. · Consents signed today.   · Plan for left robotic assisted laparoscopic adrenalectomy at next available OR date.      Discussion:  R/B of laparoscopic adrenalectomy discussed in detail including but not limited to infection, bleeding, blood transfusion, injury to surrounding organs including but not limited to bowel, solid viscera, large vessels, and kidney, possible conversion to open procedure, possible nephrectomy, possible positive margins, possible adrenal insufficiency, and global anesthesia risks including DVT, PE, pneumonia, CVA, MI, and death. Pt expressed understanding and desire to proceed.     Chief complaint:        Chief Complaint   Patient presents with    Advice Only       hyperaldosteronism          Subjective:  Cassie Lang is a 74 y.o.  OR OTHER  male who is referred by Dr. Rachael Urena for evaluation and treatment of primary hyperaldosteronism.      Patient has been followed by Dr. Sesar Astudillo in Endocrinology for primary hyperaldosteronism associated with hypertension, CKD, and hypokalemia. In 2010, aldosterone/renin ratio elevated at 44/0.45=136 (normal <30). 24 hour urine for aldosterone on 8/31/10 was 31 mcg/24 hours (normal <17) after 5 days on high sodium diet. CT adrenals with thin sections on 9/30/10 did not show any adenoma. CT abdomen 5/22/15 revealed thickening of lateral limb of left adrenal gland.      He underwent selective adrenal vein sampling by Dr. Torey Jones on 1/26/17, which revealed \"cortisol >634\" and the gradient from left to right adrenal for aldosterone was greater than 50 fold, confirming the left adrenal gland is the source of hyperaldosteronism. He was scheduled for laparoscopic left adrenalectomy, however, this has been postponed several times (x4). CT A/P 9/5/17 showed stable enlarged left adrenal gland.       Patient reports that his surgeries have been postponed multiple times due to scheduling issues and labs not being ordered. He is eager to have his surgery done and is comfortable proceeding with left robotic assisted laparoscopic adrenalectomy. He notes that he has been having a little more trouble controlling his BP.  BP today is 144/84. BP poorly controlled.       He has a history of BPH with LUTS. Per AUA SS, urinary symptoms include incomplete emptying, intermittency, urgency, weak FOS, and nocturia x3.      AUA SS 26 (4) today 4/6/18.   EUN 8 today 4/6/18.     Current everyday smoker ~10 cigarettes/day.      He denies any history of CVA, MI, or prior abdominal surgeries.      Past Medical History  Past Medical History:   Diagnosis Date    Chronic renal disease      Cigarette smoker      Diverticulosis      Esophageal reflux      Essential hypertension      Frequency of urination      Headache(784.0)      Hyperaldosteronism ([facility] 2010    Kidney stone      Loose, teeth      TMJ (dislocation of temporomandibular joint)           Past Surgical History        Past Surgical History:   Procedure Laterality Date    HX OTHER SURGICAL   8/20/09     cysto w/ureteral stent placement    HX OTHER SURGICAL   9/1/09     hx cystoscopy    HX SEPTOPLASTY   2004         Family History         Family History   Problem Relation Age of Onset    Hypertension Mother     Rodriguez Other Mother         Gastric Lymphoma    Cancer Mother      Diabetes Mother      Other Father         Accidental- dies after stent removed for blokage of biliary duct    Diabetes Father           Social History  Social History            Social History    Marital status:        Spouse name: N/A    Number of children: N/A    Years of education: N/A           Occupational History     Other             Social History Main Topics    Smoking status: Current Every Day Smoker       Packs/day: 0.30       Years: 36.00       Types: Cigarettes    Smokeless tobacco: Never Used         Comment: trying to decrease    Alcohol use No    Drug use: No    Sexual activity: Yes       Partners: Female           Other Topics Concern    Not on file      Social History Narrative              Current Outpatient Prescriptions   Medication Sig    guanFACINE ER (INTUNIV) 2 mg ER tablet Take  by mouth daily.  potassium chloride (K-DUR, KLOR-CON) 20 mEq tablet Take  by mouth two (2) times a day.  eplerenone (INSPRA) 25 mg tablet Take  by mouth.  NIFEdipine CR (ADALAT CC) 60 mg CR tablet Take 60 mg by mouth daily.  ERGOCALCIFEROL, VITAMIN D2, (VITAMIN D PO) Take  by mouth.  losartan (COZAAR) 50 mg tablet Take  by mouth daily.  VITAMIN B COMPLEX PO Take  by mouth.  multivitamin (ONE A DAY) tablet Take 1 Tab by mouth daily.        No current facility-administered medications for this visit.                Allergies   Allergen Reactions    Flomax [Tamsulosin] Anaphylaxis and Angioedema    Nasonex [Mometasone] Swelling    Penicillins Rash and Itching       Mild          Review of systems:  Constitutional: Fever: No  Skin: Rash: No  HEENT: Hearing difficulty: No  Eyes: Blurred vision: No  Cardiovascular: Chest pain: No  Respiratory: Shortness of breath: No  Gastrointestinal: Nausea/vomiting: No  Musculoskeletal: Back pain: No  Neurological: Weakness: No  Psychological: Memory loss: No  Comments/additional findings:         Physical Exam:       Visit Vitals    /84    Ht 5' 7\" (1.702 m)    Wt 146 lb (66.2 kg)    BMI 22.87 kg/m2      Constitutional: WDWN, Pleasant and appropriate affect, No acute distress. HEENT: EOMs in tact  Respiratory: No respiratory distress or difficulties  CV:  No peripheral swelling noted  Abdomen:  No abdominal masses or tenderness. Skin: Normal color and texture and No rashes or erythema noted  Neuro/Psych:  Alert and Oriented x3, affect appropriate.    EXT: no cyanosis or edema       Labs        Results for orders placed or performed in visit on 04/06/18   AMB POC URINALYSIS DIP STICK AUTO W/O MICRO   Result Value Ref Range     Color (UA POC)         Clarity (UA POC)         Glucose (UA POC) Negative Negative     Bilirubin (UA POC) Negative Negative     Ketones (UA POC) Negative Negative     Specific gravity (UA POC) 1.020 1.001 - 1.035     Blood (UA POC) 1+ Negative     pH (UA POC) 6.0 4.6 - 8.0     Protein (UA POC) 2+ Negative     Urobilinogen (UA POC) 0.2 mg/dL 0.2 - 1     Nitrites (UA POC) Negative Negative     Leukocyte esterase (UA POC) Negative Negative         Radiology  CT abdomen/pelvis WO contrast 9/5/17  ADRENALS: Stable enlarged left adrenal gland. KIDNEYS:   Right kidney is atrophic.  Stable left renal cyst.  No renal stones.  No ureteral stones. No hydronephrosis.  No hydroureter. Urinary bladder is distended.  Circumferential urinary bladder wall thickening is unchanged, likely from bladder outlet obstruction. No bladder stone. IMPRESSION:  1.  No evidence for acute abdominal process or bowel obstruction.     2.  No evidence for renal or ureteral stone.    3.  Atrophic right kidney.     4.  Enlarged prostate.        Brett Ladd MD  [facility]  [address] [facility], [address] [facility]  Phone: [phone]  Pager: [phone]

## 2018-06-07 NOTE — ANESTHESIA PREPROCEDURE EVALUATION
Anesthetic History   No history of anesthetic complications            Review of Systems / Medical History  Patient summary reviewed and pertinent labs reviewed    Pulmonary  Within defined limits                 Neuro/Psych   Within defined limits           Cardiovascular    Hypertension              Exercise tolerance: >4 METS     GI/Hepatic/Renal     GERD           Endo/Other  Within defined limits           Other Findings            Physical Exam    Airway  Mallampati: II  TM Distance: 4 - 6 cm  Neck ROM: normal range of motion   Mouth opening: Normal     Cardiovascular  Regular rate and rhythm,  S1 and S2 normal,  no murmur, click, rub, or gallop  Rhythm: regular  Rate: normal         Dental    Dentition: Lower dentition intact and Upper dentition intact  Comments: Several broken molars   Pulmonary  Breath sounds clear to auscultation               Abdominal  GI exam deferred       Other Findings            Anesthetic Plan    ASA: 3  Anesthesia type: general          Induction: Intravenous  Anesthetic plan and risks discussed with: Patient

## 2018-06-07 NOTE — ANESTHESIA PROCEDURE NOTES
Arterial Line Placement    Start time: 6/7/2018 8:55 AM  End time: 6/7/2018 9:00 AM  Performed by: Darius Moy by: Curly Cue     Pre-Procedure  Indications:  Arterial pressure monitoring  Preanesthetic Checklist: patient identified, risks and benefits discussed, anesthesia consent, site marked, patient being monitored, timeout performed and patient being monitored    Timeout Time: 08:55        Procedure:   Prep:  Chlorhexidine  Seldinger Technique?: No    Orientation:  Left  Location:  Radial artery  Catheter size:  20 G  Number of attempts:  1    Assessment:   Post-procedure:  Line secured and sterile dressing applied  Patient Tolerance:  Patient tolerated the procedure well with no immediate complications  Comment:   Under G/A post induction.

## 2018-06-07 NOTE — OP NOTES
[facility]  OPERATIVE REPORT    Ivan Gordon  MR#: [id number]  : 1950  ACCOUNT #: [de-identified]   DATE OF SERVICE: 2018    PREPROCEDURE DIAGNOSIS:  Adrenal aldosteronoma. POSTPROCEDURE DIAGNOSIS:  Adrenal aldosteronoma. PROCEDURE:  Left robotic-assisted laparoscopic left adrenalectomy. SURGEON:  Yaima Christianson MD    ASSISTANT:  Trinity Stevens     ANESTHESIA:  General.    FLUIDS:  Crystalloid. ESTIMATED BLOOD LOSS:  50 mL. SPECIMENS REMOVED:  Left adrenal gland. DRAINS:  A 16-[place] Pascal catheter. INTRAOPERATIVE FINDINGS:  1. On evaluation of the patient's adrenal gland, there was adjacent to it a small splenule versus enlarged lymph node. This was removed en bloc with the left adrenal gland. INDICATION FOR THE PROCEDURE:  The patient is a 62-year-old male with a history of left adrenal adenoma. Functional workup revealed that it was a functioning aldosteronoma. The patient was on Inspar preoperatively and workup included adrenal vein sampling that was suspicious for adrenal aldosteronoma. Risks, benefits and alternatives were explained. The patient decided to proceed. DETAILS OF THE PROCEDURE:  The patient was first identified in the holding area and taken back to the operating room. Perioperative antibiotics were given and sequential compression device was placed. Anesthesia was induced. The patient was then placed in the flank position with the left side up. The patient was prepped and draped in the usual sterile fashion. Time out was performed. Care was taken to pad all pressure points. First, a Veress needle was used to gain access into the abdomen. Drop test was performed and the belly was insufflated under low flow to 15 mmHg.   The ports were marked out in a standard dog leg configuration with an 8 mm port just below the costal margin the level of the rectus belly, a second one 15 cm below this directly caudal and then in the left lower quadrant, an 8 mm port, a 12 mm camera trocar was placed in between the 2nd and 3rd arms and an assistant 12 mm port was placed in the umbilicus. We used the visualizing obturator to gain access into the abdomen. Once this was done, we assured no visceral injury with the Veress needle. We then placed all ports under direct vision. The umbilical port was closed with a Betito-Lily device in figure-of-eight fashion and the camera port was also closed with a Betito-Lily that was preplaced. Once all ports were in position, the [employer] robot was docked. We used ProGrasp in the fourth arm, a fenestrated bipolar in the left arm and a monopolar scissors in the right arm. First, we dropped down the colon and the spleen after taking down the white line. The gonadal vein was identified and we followed this to the renal vein. We then followed this over and the adrenal vein was identified and completely dissected circumferentially. Clips were placed x 3 using the robotic clip applier and then the adrenal vein was cut. The attachments to the adrenal gland and the kidney were then taken down using a combination of monopolar and bipolar. We then lifted the adrenal gland off the aorta and took down the more medial attachments freeing up the adrenal gland completely. Once this was completely taken out en bloc, it was placed in a bag and hemostasis was assured. FloSeal was placed in the resection bed and the gas was turned down to 8 mmHg. All sponge, needle and lap counts were correct. The da Ayleen surgical system was undocked. We then removed all port sites under direct vision. The specimen was delivered through the midline port and our Betito-Lily sutures were tied down. All wounds were irrigated and closed with Monocryl in a subcuticular fashion. At the end of the case, the patient was awoke from anesthesia and taken back to PACU in stable condition.   Of note, I was scrubbed and present for all critical portions of the case.       Gary Rouse MD       [facility]  D: 06/07/2018 13:36     T: 06/07/2018 14:33  JOB #: [id number]

## 2018-06-07 NOTE — IP AVS SNAPSHOT
Etta Gasca 
 
 
 [address] [address] [facility] Patient: Yumiko Torres MRN: [id number] ZSY:[birth year] A check karly indicates which time of day the medication should be taken. My Medications START taking these medications Instructions Each Dose to Equal  
 Morning Noon Evening Bedtime  
 docusate sodium 100 mg capsule Commonly known as:  [place] Dolin Your last dose was: Your next dose is: Take 1 Cap by mouth two (2) times a day for 15 days. 100 mg  
    
   
   
   
  
 oxyCODONE-acetaminophen 5-325 mg per tablet Commonly known as:  PERCOCET Your last dose was: Your next dose is: Take 1-2 Tabs by mouth every four (4) hours as needed for Pain. Max Daily Amount: 12 Tabs. 1-2 Tab CONTINUE taking these medications Instructions Each Dose to Equal  
 Morning Noon Evening Bedtime COZAAR 50 mg tablet Generic drug:  losartan Your last dose was: Your next dose is: Take 100 mg by mouth daily. 100 mg  
    
   
   
   
  
 eplerenone 25 mg tablet Commonly known as:  Roberto Le Your last dose was: Your next dose is: Take 75 mg by mouth two (2) times a day. 75 mg  
    
   
   
   
  
 guanFACINE ER 2 mg ER tablet Commonly known as:  INTUNIV Your last dose was: Your next dose is: Take 2 mg by mouth daily. 2 mg  
    
   
   
   
  
 multivitamin tablet Commonly known as:  ONE A DAY Your last dose was: Your next dose is: Take 1 Tab by mouth daily. 1 Tab NIFEdipine ER 60 mg ER tablet Commonly known as:  ADALAT CC Your last dose was: Your next dose is: Take 90 mg by mouth daily. 90 mg  
    
   
   
   
  
 potassium chloride 20 mEq tablet Commonly known as:  BENEDICT-DAVID WEST-ANISA  
   
 Your last dose was: Your next dose is: Take 20 mEq by mouth daily. 20 mEq VITAMIN B COMPLEX PO Your last dose was: Your next dose is: Take  by mouth. VITAMIN D3 5,000 unit Tab tablet Generic drug:  cholecalciferol (VITAMIN D3) Your last dose was: Your next dose is: Take 5,000 Units by mouth daily. 5000 Units Where to Get Your Medications Information on where to get these meds will be given to you by the nurse or doctor. ! Ask your nurse or doctor about these medications  
  docusate sodium 100 mg capsule  
 oxyCODONE-acetaminophen 5-325 mg per tablet

## 2018-06-08 LAB
ANION GAP SERPL CALC-SCNC: 9 MMOL/L (ref 3–18)
BUN SERPL-MCNC: 25 MG/DL (ref 7–18)
BUN/CREAT SERPL: 14 (ref 12–20)
CALCIUM SERPL-MCNC: 7.9 MG/DL (ref 8.5–10.1)
CHLORIDE SERPL-SCNC: 106 MMOL/L (ref 100–108)
CO2 SERPL-SCNC: 26 MMOL/L (ref 21–32)
CREAT SERPL-MCNC: 1.8 MG/DL (ref 0.6–1.3)
ERYTHROCYTE [DISTWIDTH] IN BLOOD BY AUTOMATED COUNT: 14.9 % (ref 11.6–14.5)
GLUCOSE SERPL-MCNC: 83 MG/DL (ref 74–99)
HCT VFR BLD AUTO: 37.7 % (ref 36–48)
HGB BLD-MCNC: 12.9 G/DL (ref 13–16)
MAGNESIUM SERPL-MCNC: 1.7 MG/DL (ref 1.6–2.6)
MCH RBC QN AUTO: 30.3 PG (ref 24–34)
MCHC RBC AUTO-ENTMCNC: 34.2 G/DL (ref 31–37)
MCV RBC AUTO: 88.5 FL (ref 74–97)
PLATELET # BLD AUTO: 223 K/UL (ref 135–420)
PMV BLD AUTO: 10.7 FL (ref 9.2–11.8)
POTASSIUM SERPL-SCNC: 3.2 MMOL/L (ref 3.5–5.5)
RBC # BLD AUTO: 4.26 M/UL (ref 4.7–5.5)
SODIUM SERPL-SCNC: 141 MMOL/L (ref 136–145)
WBC # BLD AUTO: 11.4 K/UL (ref 4.6–13.2)

## 2018-06-08 PROCEDURE — 65270000029 HC RM PRIVATE

## 2018-06-08 PROCEDURE — 74011000250 HC RX REV CODE- 250: Performed by: FAMILY MEDICINE

## 2018-06-08 PROCEDURE — 74011250636 HC RX REV CODE- 250/636: Performed by: HOSPITALIST

## 2018-06-08 PROCEDURE — 80048 BASIC METABOLIC PNL TOTAL CA: CPT | Performed by: UROLOGY

## 2018-06-08 PROCEDURE — 74011250637 HC RX REV CODE- 250/637: Performed by: HOSPITALIST

## 2018-06-08 PROCEDURE — 83735 ASSAY OF MAGNESIUM: CPT | Performed by: HOSPITALIST

## 2018-06-08 PROCEDURE — 77030027138 HC INCENT SPIROMETER -A

## 2018-06-08 PROCEDURE — 85027 COMPLETE CBC AUTOMATED: CPT | Performed by: UROLOGY

## 2018-06-08 PROCEDURE — 74011250636 HC RX REV CODE- 250/636: Performed by: UROLOGY

## 2018-06-08 PROCEDURE — 74011250637 HC RX REV CODE- 250/637: Performed by: NURSE PRACTITIONER

## 2018-06-08 PROCEDURE — 74011000250 HC RX REV CODE- 250: Performed by: UROLOGY

## 2018-06-08 PROCEDURE — 74011250637 HC RX REV CODE- 250/637: Performed by: UROLOGY

## 2018-06-08 PROCEDURE — 77010033678 HC OXYGEN DAILY

## 2018-06-08 PROCEDURE — 36415 COLL VENOUS BLD VENIPUNCTURE: CPT | Performed by: UROLOGY

## 2018-06-08 PROCEDURE — 74011250636 HC RX REV CODE- 250/636: Performed by: NURSE ANESTHETIST, CERTIFIED REGISTERED

## 2018-06-08 RX ORDER — METOPROLOL TARTRATE 5 MG/5ML
5 INJECTION INTRAVENOUS
Status: DISCONTINUED | OUTPATIENT
Start: 2018-06-08 | End: 2018-06-09 | Stop reason: HOSPADM

## 2018-06-08 RX ORDER — LOSARTAN POTASSIUM 50 MG/1
100 TABLET ORAL DAILY
Status: DISCONTINUED | OUTPATIENT
Start: 2018-06-08 | End: 2018-06-09 | Stop reason: HOSPADM

## 2018-06-08 RX ORDER — MAGNESIUM SULFATE HEPTAHYDRATE 40 MG/ML
2 INJECTION, SOLUTION INTRAVENOUS
Status: COMPLETED | OUTPATIENT
Start: 2018-06-08 | End: 2018-06-08

## 2018-06-08 RX ORDER — SODIUM CHLORIDE, SODIUM LACTATE, POTASSIUM CHLORIDE, CALCIUM CHLORIDE 600; 310; 30; 20 MG/100ML; MG/100ML; MG/100ML; MG/100ML
100 INJECTION, SOLUTION INTRAVENOUS CONTINUOUS
Status: DISCONTINUED | OUTPATIENT
Start: 2018-06-08 | End: 2018-06-09 | Stop reason: HOSPADM

## 2018-06-08 RX ORDER — DOCUSATE SODIUM 100 MG/1
100 CAPSULE, LIQUID FILLED ORAL 2 TIMES DAILY
Status: DISCONTINUED | OUTPATIENT
Start: 2018-06-08 | End: 2018-06-09 | Stop reason: HOSPADM

## 2018-06-08 RX ORDER — EPLERENONE 25 MG/1
50 TABLET, FILM COATED ORAL 2 TIMES DAILY
Status: DISCONTINUED | OUTPATIENT
Start: 2018-06-08 | End: 2018-06-09 | Stop reason: HOSPADM

## 2018-06-08 RX ORDER — FACIAL-BODY WIPES
10 EACH TOPICAL DAILY PRN
Status: DISCONTINUED | OUTPATIENT
Start: 2018-06-08 | End: 2018-06-09 | Stop reason: HOSPADM

## 2018-06-08 RX ORDER — POTASSIUM CHLORIDE 750 MG/1
40 TABLET, FILM COATED, EXTENDED RELEASE ORAL
Status: DISCONTINUED | OUTPATIENT
Start: 2018-06-08 | End: 2018-06-08

## 2018-06-08 RX ORDER — NIFEDIPINE 90 MG/1
90 TABLET, EXTENDED RELEASE ORAL DAILY
Status: DISCONTINUED | OUTPATIENT
Start: 2018-06-08 | End: 2018-06-09 | Stop reason: HOSPADM

## 2018-06-08 RX ORDER — POTASSIUM CHLORIDE 750 MG/1
40 TABLET, FILM COATED, EXTENDED RELEASE ORAL
Status: COMPLETED | OUTPATIENT
Start: 2018-06-08 | End: 2018-06-08

## 2018-06-08 RX ADMIN — POTASSIUM CHLORIDE 40 MEQ: 750 TABLET, EXTENDED RELEASE ORAL at 09:06

## 2018-06-08 RX ADMIN — EPLERENONE 50 MG: 25 TABLET ORAL at 21:05

## 2018-06-08 RX ADMIN — HEPARIN SODIUM 5000 UNITS: 5000 INJECTION, SOLUTION INTRAVENOUS; SUBCUTANEOUS at 23:01

## 2018-06-08 RX ADMIN — Medication 10 ML: at 21:06

## 2018-06-08 RX ADMIN — Medication 10 ML: at 06:22

## 2018-06-08 RX ADMIN — METOPROLOL TARTRATE 5 MG: 5 INJECTION INTRAVENOUS at 23:01

## 2018-06-08 RX ADMIN — NIFEDIPINE 90 MG: 90 TABLET, FILM COATED, EXTENDED RELEASE ORAL at 12:09

## 2018-06-08 RX ADMIN — Medication 10 ML: at 16:36

## 2018-06-08 RX ADMIN — SODIUM CHLORIDE, SODIUM LACTATE, POTASSIUM CHLORIDE, AND CALCIUM CHLORIDE 100 ML/HR: 600; 310; 30; 20 INJECTION, SOLUTION INTRAVENOUS at 17:54

## 2018-06-08 RX ADMIN — HEPARIN SODIUM 5000 UNITS: 5000 INJECTION, SOLUTION INTRAVENOUS; SUBCUTANEOUS at 00:57

## 2018-06-08 RX ADMIN — HYDRALAZINE HYDROCHLORIDE 20 MG: 20 INJECTION INTRAMUSCULAR; INTRAVENOUS at 17:11

## 2018-06-08 RX ADMIN — HYDRALAZINE HYDROCHLORIDE 20 MG: 20 INJECTION INTRAMUSCULAR; INTRAVENOUS at 09:14

## 2018-06-08 RX ADMIN — DOCUSATE SODIUM 100 MG: 100 CAPSULE, LIQUID FILLED ORAL at 17:11

## 2018-06-08 RX ADMIN — LOSARTAN POTASSIUM 100 MG: 50 TABLET ORAL at 12:08

## 2018-06-08 RX ADMIN — FAMOTIDINE 20 MG: 10 INJECTION INTRAVENOUS at 21:05

## 2018-06-08 RX ADMIN — OXYBUTYNIN CHLORIDE 5 MG: 5 TABLET ORAL at 12:08

## 2018-06-08 RX ADMIN — SODIUM CHLORIDE, SODIUM LACTATE, POTASSIUM CHLORIDE, AND CALCIUM CHLORIDE 50 ML/HR: 600; 310; 30; 20 INJECTION, SOLUTION INTRAVENOUS at 00:05

## 2018-06-08 RX ADMIN — MAGNESIUM SULFATE HEPTAHYDRATE 2 G: 40 INJECTION, SOLUTION INTRAVENOUS at 17:54

## 2018-06-08 RX ADMIN — SODIUM CHLORIDE, SODIUM LACTATE, POTASSIUM CHLORIDE, AND CALCIUM CHLORIDE 50 ML/HR: 600; 310; 30; 20 INJECTION, SOLUTION INTRAVENOUS at 09:16

## 2018-06-08 RX ADMIN — OXYCODONE HYDROCHLORIDE AND ACETAMINOPHEN 1 TABLET: 5; 325 TABLET ORAL at 01:09

## 2018-06-08 RX ADMIN — HEPARIN SODIUM 5000 UNITS: 5000 INJECTION, SOLUTION INTRAVENOUS; SUBCUTANEOUS at 16:34

## 2018-06-08 RX ADMIN — HEPARIN SODIUM 5000 UNITS: 5000 INJECTION, SOLUTION INTRAVENOUS; SUBCUTANEOUS at 09:07

## 2018-06-08 RX ADMIN — OXYCODONE HYDROCHLORIDE AND ACETAMINOPHEN 1 TABLET: 5; 325 TABLET ORAL at 14:03

## 2018-06-08 NOTE — PROGRESS NOTES
Problem: Falls - Risk of  Goal: *Absence of Falls  Document Dasha Fall Risk and appropriate interventions in the flowsheet.    Outcome: Progressing Towards Goal  Fall Risk Interventions:  Mobility Interventions: Communicate number of staff needed for ambulation/transfer, OT consult for ADLs, Patient to call before getting OOB, PT Consult for mobility concerns, Strengthening exercises (ROM-active/passive)         Medication Interventions: Assess postural VS orthostatic hypotension, Bed/chair exit alarm, Evaluate medications/consider consulting pharmacy, Patient to call before getting OOB, Teach patient to arise slowly    Elimination Interventions: Call light in reach, Patient to call for help with toileting needs, Toilet paper/wipes in reach, Toileting schedule/hourly rounds

## 2018-06-08 NOTE — PROGRESS NOTES
Patient is unable to communicate at this time as he is resting peacefully.  offered prayer and left Spiritual Care brochure. Chaplains will continue to follow and will provide pastoral care on an as needed/requested basis.     Darrian Montes De Oca   Spiritual Care   [phone]

## 2018-06-08 NOTE — PROGRESS NOTES
Physical Exam   Skin:               2010 - Dr. Consuelo Yoo in room. Bed side report received from [facility]. Patient alert & oriented. Pain rate of 7/10.  5 Lap site derma bond dry & intact. Pascal catheter in place draining clear yellow urine. Call bell within reach. 2029  - Hydralazine 20 mg IV given for /80. Medicating pt for  Pain. 2131 -  /77  Pt in bed eyes closed resting quietly. 0109 - Medicated pt for pain. 0230 - Pt eyes closed resting quietly. 0455 - /88  Patient awake watching tv, no acute distress noted. Declining pain meds.

## 2018-06-08 NOTE — ROUTINE PROCESS
Bedside and Verbal shift change report given to Lupe PerezRN (oncoming nurse) by Smooth Stanford RN BSN (offgoing nurse). Report given with SBAR, Kardex, Intake/Output, MAR and Recent Results.

## 2018-06-08 NOTE — ROUTINE PROCESS
Bedside and Verbal shift change report given to Ibrahima Alvarez, RN (oncoming nurse) by Harjit Herrera RN  (offgoing nurse). Report included the following information SBAR, Kardex and MAR.

## 2018-06-08 NOTE — PROGRESS NOTES
Problem: Falls - Risk of  Goal: *Absence of Falls  Document Dasha Fall Risk and appropriate interventions in the flowsheet.    Outcome: Progressing Towards Goal  Fall Risk Interventions:            Medication Interventions: Patient to call before getting OOB, Teach patient to arise slowly    Elimination Interventions: Patient to call for help with toileting needs, Toileting schedule/hourly rounds Bladder non-tender and non-distended. Urine clear yellow.

## 2018-06-08 NOTE — PROGRESS NOTES
Physical Exam   Skin:             1540 - Bedside and Verbal shift change report given to Pk Fitzpatrick RN (oncoming nurse) by Sanford Recio RN (offgoing nurse). Report included the following information SBAR, Kardex, Intake/Output and MAR. Pt voided 200mL. Output recorded. Pt states he is passing flatus. Will cont to monitor. 1658 - vitals noted for pt w//90. Will administer prn meds    1711 - prn hydralazine administered. Will cont to monitor. 1750 - VS taken. Will cont to monitor. Patient Vitals for the past 4 hrs:   Temp Pulse Resp BP SpO2   06/08/18 1750 98.5 °F (36.9 °C) (!) 106 16 157/72 96 %   06/08/18 1658 98.8 °F (37.1 °C) 91 16 193/90 97 %       1915 - Bedside and Verbal shift change report given to Jeremiah Lazaro RN (oncoming nurse) by Pk Fitzpatrick RN (offgoing nurse). Report included the following information SBAR, Kardex, Intake/Output and MAR.

## 2018-06-08 NOTE — CONSULTS
Consult    Patient: Hali Forman               Sex: male          DOA: 6/7/2018       YOB: 1950      Age:  74 y.o.        LOS:  LOS: 0 days        No chief complaint on file. HPI:     Hali Forman is a 74 y.o. male who presents with aldosteronoma. He is s/p day#1 left adrenalectomy. We appreciate the consult to manage his blood pressure. Patient denies headache, chest pain , sob. He rates abdominal pain 7/10 from surgical site. Otherwise no other complaints. He has pmhx of gerd, htn,hyperaldosteronism, CKD. His current /75. Preop patient was on cozaar and eplerenone which were discontinued. Past Medical History:   Diagnosis Date    Chronic renal disease     Cigarette smoker     Diverticulosis     Esophageal reflux     Essential hypertension     Frequency of urination     GERD (gastroesophageal reflux disease)     Headache(784.0)     Hyperaldosteronism ([address] 2010    Kidney stone     Loose, teeth     TMJ (dislocation of temporomandibular joint)    . Past Surgical History:   Procedure Laterality Date    HX OTHER SURGICAL  10/2009    Lithotripsy    HX OTHER SURGICAL  08/2017    Colonoscopy    HX SEPTOPLASTY  2004       No current facility-administered medications on file prior to encounter. Current Outpatient Prescriptions on File Prior to Encounter   Medication Sig Dispense Refill    potassium chloride (K-DUR, KLOR-CON) 20 mEq tablet Take 20 mEq by mouth daily.  eplerenone (INSPRA) 25 mg tablet Take 75 mg by mouth two (2) times a day. 5    NIFEdipine CR (ADALAT CC) 60 mg CR tablet Take 90 mg by mouth daily.  losartan (COZAAR) 50 mg tablet Take 100 mg by mouth daily.  VITAMIN B COMPLEX PO Take  by mouth.  multivitamin (ONE A DAY) tablet Take 1 Tab by mouth daily.            Social History     Social History    Marital status:      Spouse name: N/A    Number of children: N/A    Years of education: N/A Occupational History     Other     Social History Main Topics    Smoking status: Current Every Day Smoker     Packs/day: 0.30     Years: 36.00     Types: Cigarettes    Smokeless tobacco: Never Used      Comment: trying to decrease    Alcohol use No    Drug use: No    Sexual activity: Yes     Partners: Female     Other Topics Concern    Not on file     Social History Narrative       Prior to Admission Medications   Prescriptions Last Dose Informant Patient Reported? Taking? NIFEdipine CR (ADALAT CC) 60 mg CR tablet 6/6/2018 at 2200  Yes Yes   Sig: Take 90 mg by mouth daily. VITAMIN B COMPLEX PO 6/5/2018  Yes No   Sig: Take  by mouth. cholecalciferol, VITAMIN D3, (VITAMIN D3) 5,000 unit tab tablet 6/6/2018 at 2200  Yes Yes   Sig: Take 5,000 Units by mouth daily. eplerenone (INSPRA) 25 mg tablet 6/6/2018 at 2200  Yes Yes   Sig: Take 75 mg by mouth two (2) times a day.   guanFACINE ER (INTUNIV) 2 mg ER tablet 6/6/2018 at 2200  Yes Yes   Sig: Take 2 mg by mouth daily. losartan (COZAAR) 50 mg tablet 6/6/2018 at 2200  Yes Yes   Sig: Take 100 mg by mouth daily. multivitamin (ONE A DAY) tablet 6/5/2018  Yes No   Sig: Take 1 Tab by mouth daily. potassium chloride (K-DUR, KLOR-CON) 20 mEq tablet 6/6/2018 at 2200  Yes Yes   Sig: Take 20 mEq by mouth daily.       Facility-Administered Medications: None       Family History   Problem Relation Age of Onset    Hypertension Mother     Other Mother      Gastric Lymphoma    Cancer Mother     Diabetes Mother     Other Father      Accidental- dies after stent removed for blokage of biliary duct    Diabetes Father        Allergies   Allergen Reactions    Flomax [Tamsulosin] Anaphylaxis and Angioedema    Nasonex [Mometasone] Swelling    Penicillins Rash and Itching     Mild        Review of Systems - General ROS: negative  Endocrine ROS: negative for - palpitations  Respiratory ROS: negative for - cough or shortness of breath  Cardiovascular ROS: no chest pain or dyspnea on exertion  Gastrointestinal ROS: no abdominal pain, change in bowel habits, or black or bloody stools  positive for - abdominal pain ( surgical site)  Genito-Urinary ROS: no dysuria, trouble voiding, or hematuria  Musculoskeletal ROS: negative  Neurological ROS: negative    Physical Exam:     Gen : No distress  Heent : eomi, perrla  Chest : CTAB  Cardiac : S1/S2 ,no Murmurs  ABD: soft , ttp surgical site  msk : Grossly normal  Neuro : OX3  Psy: normal     Visit Vitals    /75 (BP 1 Location: Right arm, BP Patient Position: At rest)    Pulse (!) 59    Temp 98.8 °F (37.1 °C)    Resp 16    Ht 5' 7\" (1.702 m)    Wt 67.3 kg (148 lb 6.4 oz)    SpO2 98%    BMI 23.24 kg/m2       Ancillary Studies: All lab and imaging reviewed for the past 24 hours.     Recent Results (from the past 24 hour(s))   TYPE & SCREEN    Collection Time: 06/07/18  7:05 AM   Result Value Ref Range    Crossmatch Expiration 06/10/2018     ABO/Rh(D) A POSITIVE     Antibody screen NEG    CBC W/O DIFF    Collection Time: 06/07/18 12:15 PM   Result Value Ref Range    WBC 13.5 (H) 4.6 - 13.2 K/uL    RBC 4.43 (L) 4.70 - 5.50 M/uL    HGB 13.3 13.0 - 16.0 g/dL    HCT 38.9 36.0 - 48.0 %    MCV 87.8 74.0 - 97.0 FL    MCH 30.0 24.0 - 34.0 PG    MCHC 34.2 31.0 - 37.0 g/dL    RDW 14.7 (H) 11.6 - 14.5 %    PLATELET 251 958 - 346 K/uL    MPV 10.8 9.2 - 62.0 FL   METABOLIC PANEL, BASIC    Collection Time: 06/07/18 12:15 PM   Result Value Ref Range    Sodium 144 136 - 145 mmol/L    Potassium 3.7 3.5 - 5.5 mmol/L    Chloride 111 (H) 100 - 108 mmol/L    CO2 22 21 - 32 mmol/L    Anion gap 11 3.0 - 18 mmol/L    Glucose 146 (H) 74 - 99 mg/dL    BUN 38 (H) 7.0 - 18 MG/DL    Creatinine 2.32 (H) 0.6 - 1.3 MG/DL    BUN/Creatinine ratio 16 12 - 20      GFR est AA 34 (L) >60 ml/min/1.73m2    GFR est non-AA 28 (L) >60 ml/min/1.73m2    Calcium 8.3 (L) 8.5 - 10.1 MG/DL       Assessment/Plan     Active Problems: Hyperaldosteronism ([facility] [address] (6/7/2018)      Hematuria (6/7/2018)      Hypertension   Leukocytosis   Hx Vergil Canal   OSBALDO/CKD3  Hx BPH  Current smoker       PLAN:    Hyperaldosteronism - 2/2 left aldosteronoma . S/p left  Adrenalectomy day #1. Pain control as needed. Urology following     Hypertension - 2/2 above. Will start Norvasc 5 mg every day and titrate . Hydralazine IV 20 mg q6h prn. Monitor BP closely. Monitor K level. BMP in AM     OSBALDO/CKD3 - CR 2.32 , BL 1.9 per patient recall. Avoid Nephrotoxins. IVF. Repeat BMP in AM     Leukocytosis - WBC 13.5. Likely reactive post op.  Will recheck CBC in AM     Hx Gerd - famotidine     Hx BPH - Home med     Current smoker - advise cessation     DVT prophylaxis - SCD's, Heparin sq tid     GI prophylaxis - Famotidine       Melissa Sanders MD  6/7/2018  8:17 PM

## 2018-06-08 NOTE — PROGRESS NOTES
0800  Received pt on bed, singletary intact and draining clear urine, all lap site no redness and no swelling noted, pain under control at this time. Encourage triflo which he does it well, offer to help him  Walk in the hallway. 0900   BP elevated , asymptomatic, Hydralazine IV given for BP.    1100  Up  Walking in the hallway. 1200  Singletary  Removed per MD.intructions given. 1400  Due to void by 1800, medicated for pain, IV to H.L. Offered to  Walk but wants it  After pain subsides.

## 2018-06-08 NOTE — ANCILLARY DISCHARGE INSTRUCTIONS
Patient and/or next of kin has been given the [facility] Important Message From Medicare About Your Rights\" letter and all questions were answered.

## 2018-06-08 NOTE — PROGRESS NOTES
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 Pt is a 74 y. o. male with primary hyperaldosteronism. S/p left adrenalectomy 6/7                        In 2010, aldosterone/renin ratio elevated at 44/0.01=250 (normal <30).                       24 hour urine for aldosterone on 8/31/10 was 31 mcg/24 hours (normal <17) after 5 days on high sodium diet.                         CT adrenals with thin sections on 9/30/10 did not show any adenoma.                         CT abdomen 5/22/15 revealed thickening of lateral limb of left adrenal gland.                         Selective adrenal vein sampling by Dr. Brett Knox on 1/26/17 revealed \"cortisol >634\" and the gradient from left to right adrenal for aldosterone was greater than 50 fold, confirming the left adrenal gland is the source of hyperaldosteronism.                         CT A/P 9/5/17 showed stable enlarged left adrenal gland. 2. Kidney stones with associated hematuria. S/p URS/HLL by Dr. Holger Frye in 2009. 3. BPH  4. CKD  5. HTN  6. GERD        Plan:  Doing well and continue with treatment   1. Monitor HTN, med adjustments per [facility]  2. Due to void, advance diet  3. Labs ok, will hold on K replacement for now. F/u [facility] practice recs  4. Keep overnight to monitor HTN  5. SQH/ICS/OOB    Danni Harvey MD    [phone]    The history, physical and relevant labs/imaging were reviewed by Jermain Pierre MD on 6/8/2018 and agree with the resident's assessment and plan.   I have independently seen and examined the patient and the note has been edited as above. Needs BP better optimized prior to d/c  Currently only on Norvasc, appears ACEI will be added back to day by IM  Will need IM assistance regarding which BP meds to add back to optimize his home regimen  VT today  D/w patient given duration of having this aldosterone producing some degree of his HTN may not be reversible given its adverse affect on his kidney function as well    Mikal Braden MD  , Dept of Urology  [facility]  [facility] [place] [place]  Pager #: [phone]        Subjective:     Daily Progress Note: 2018 10:11 AM    Terry Sewell is doing excellent. He reports pain is well controlled. He has no complaints. He is tolerating clear liquids and ambulating with assistance. Pascal is draining well. Objective:     Visit Vitals    /85 (BP 1 Location: Right arm, BP Patient Position: At rest)    Pulse 76    Temp 98.6 °F (37 °C)    Resp 16    Ht 5' 7\" (1.702 m)    Wt 148 lb 6.4 oz (67.3 kg)    SpO2 97%    BMI 23.24 kg/m2        Temp (24hrs), Av.2 °F (36.8 °C), Min:96.4 °F (35.8 °C), Max:99.3 °F (37.4 °C)      Intake and Output:  / 1901 -  0700  In: 2783.3 [P.O.:560; I.V.:2223.3]  Out: 3200 [Urine:3150]       PHYSICAL EXAMINATION:   Visit Vitals    /85 (BP 1 Location: Right arm, BP Patient Position: At rest)    Pulse 76    Temp 98.6 °F (37 °C)    Resp 16    Ht 5' 7\" (1.702 m)    Wt 148 lb 6.4 oz (67.3 kg)    SpO2 97%    BMI 23.24 kg/m2     Constitutional: Well developed, well nourished. No acute distress. HEENT: Normocephalic, Atraumatic, EOM's intact   CV:  RRR  Respiratory: No respiratory distress or difficulties breathing   Abdomen:  Soft, non-tender, non-distended; inc c/d/i   Male:   CVA tenderness: none          Prostate: n/a         SCROTUM:  normal         PENIS: normal  Pascal: clear  Skin: No evidence of jaundice. Normal color  Neuro/Psych:  Alert and oriented.  Affect appropriate. Lymphatic:   No enlarged inguinal lymph nodes. Incision: clean, dry, no drainage    Lab/Data Review:  Lab results reviewed. For significant abnormal values and values requiring intervention, see assessment and plan. Labs:     Labs: Results:   Chemistry    Recent Labs      06/08/18   0528  06/07/18   1215   GLU  83  146*   NA  141  144   K  3.2*  3.7   CL  106  111*   CO2  26  22   BUN  25*  38*   CREA  1.80*  2.32*   CA  7.9*  8.3*   AGAP  9  11   BUCR  14  16      CBC w/Diff Recent Labs      06/08/18   0528  06/07/18   1215   WBC  11.4  13.5*   RBC  4.26*  4.43*   HGB  12.9*  13.3   HCT  37.7  38.9   PLT  223  228      Cultures No results for input(s): CULT in the last 72 hours. All Micro Results     None            Urinalysis Potassium   Date Value Ref Range Status   06/08/2018 3.2 (L) 3.5 - 5.5 mmol/L Final     Creatinine   Date Value Ref Range Status   06/08/2018 1.80 (H) 0.6 - 1.3 MG/DL Final     BUN   Date Value Ref Range Status   06/08/2018 25 (H) 7.0 - 18 MG/DL Final     Prostate Specific Ag   Date Value Ref Range Status   09/27/2011 1.8        PSA No results for input(s): PSA in the last 72 hours.    Coagulation No results found for: PTP, INR, APTT

## 2018-06-08 NOTE — PROGRESS NOTES
Chart reviewed and pt verified demographics. He has 3 insurances, [place] for [employer]. PCP is Dr Beverly Santana, last seen May 24th. He lives with his wife Christa Cortes cell # [phone]. and is independent with ADL. He has never used Home Health or any DME. Reason for Admission:   Left adrenalectomy        RRAT:         8                  Plan for utilizing home health:      Not at this time. Likelihood of Readmission:  Low, pt has family support, 3 insurances and PCP. Transition of Care Plan:     He plans to go home.

## 2018-06-08 NOTE — PROGRESS NOTES
2 [facility]  Hospitalist Division           Inpatient Daily Progress Note        Patient: Dean Wen MRN: [id number]  [facility]: [id number]    YOB: 1950  Age: 74 y.o. 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 male who presents with aldosteronoma. He is s/p day#1 left adrenalectomy.      We appreciate the consult to manage his blood pressure. Patient denies headache, chest pain , sob. He rates abdominal pain 7/10 from surgical site. Otherwise no other complaints. He has pmhx of gerd, htn,hyperaldosteronism, CKD. Subjective:      \"I feel good\"     Objective:      Visit Vitals    /85 (BP 1 Location: Right arm, BP Patient Position: At rest)    Pulse 76    Temp 98.6 °F (37 °C)    Resp 16    Ht 5' 7\" (1.702 m)    Wt 67.3 kg (148 lb 6.4 oz)    SpO2 97%    BMI 23.24 kg/m2           Physical Exam:  General appearance: alert, cooperative, no distress  Head: Normocephalic, without obvious abnormality, atraumatic  Lungs: CTA  Heart: RRR, S1, S2 normal, no murmur, click, rub or gallop  Abdomen: soft, non tender, non distended. Normoactive bowel sounds.    Extremities: extremities normal, atraumatic, no cyanosis or edema, pulses palpable   Skin: Skin color, texture, turgor normal. Surgical site intact   Neurologic: Grossly normal  PSY: Mood and affect normal, appropriately behaved      Intake and Output:  Current Shift:     Last three shifts:  06/06 1901 - 06/08 0700  In: 2783.3 [P.O.:560; I.V.:2223.3]  Out: 3200 [Urine:3150]    Recent Results (from the past 24 hour(s))   CBC W/O DIFF    Collection Time: 06/07/18 12:15 PM   Result Value Ref Range    WBC 13.5 (H) 4.6 - 13.2 K/uL    RBC 4.43 (L) 4.70 - 5.50 M/uL    HGB 13.3 13.0 - 16.0 g/dL    HCT 38.9 36.0 - 48.0 %    MCV 87.8 74.0 - 97.0 FL    MCH 30.0 24.0 - 34.0 PG MCHC 34.2 31.0 - 37.0 g/dL    RDW 14.7 (H) 11.6 - 14.5 %    PLATELET 388 581 - 966 K/uL    MPV 10.8 9.2 - 49.9 FL   METABOLIC PANEL, BASIC    Collection Time: 06/07/18 12:15 PM   Result Value Ref Range    Sodium 144 136 - 145 mmol/L    Potassium 3.7 3.5 - 5.5 mmol/L    Chloride 111 (H) 100 - 108 mmol/L    CO2 22 21 - 32 mmol/L    Anion gap 11 3.0 - 18 mmol/L    Glucose 146 (H) 74 - 99 mg/dL    BUN 38 (H) 7.0 - 18 MG/DL    Creatinine 2.32 (H) 0.6 - 1.3 MG/DL    BUN/Creatinine ratio 16 12 - 20      GFR est AA 34 (L) >60 ml/min/1.73m2    GFR est non-AA 28 (L) >60 ml/min/1.73m2    Calcium 8.3 (L) 8.5 - 87.7 MG/DL   METABOLIC PANEL, BASIC    Collection Time: 06/08/18  5:28 AM   Result Value Ref Range    Sodium 141 136 - 145 mmol/L    Potassium 3.2 (L) 3.5 - 5.5 mmol/L    Chloride 106 100 - 108 mmol/L    CO2 26 21 - 32 mmol/L    Anion gap 9 3.0 - 18 mmol/L    Glucose 83 74 - 99 mg/dL    BUN 25 (H) 7.0 - 18 MG/DL    Creatinine 1.80 (H) 0.6 - 1.3 MG/DL    BUN/Creatinine ratio 14 12 - 20      GFR est AA 46 (L) >60 ml/min/1.73m2    GFR est non-AA 38 (L) >60 ml/min/1.73m2    Calcium 7.9 (L) 8.5 - 10.1 MG/DL   CBC W/O DIFF    Collection Time: 06/08/18  5:28 AM   Result Value Ref Range    WBC 11.4 4.6 - 13.2 K/uL    RBC 4.26 (L) 4.70 - 5.50 M/uL    HGB 12.9 (L) 13.0 - 16.0 g/dL    HCT 37.7 36.0 - 48.0 %    MCV 88.5 74.0 - 97.0 FL    MCH 30.3 24.0 - 34.0 PG    MCHC 34.2 31.0 - 37.0 g/dL    RDW 14.9 (H) 11.6 - 14.5 %    PLATELET 222 966 - 815 K/uL    MPV 10.7 9.2 - 11.8 FL   MAGNESIUM    Collection Time: 06/08/18  5:28 AM   Result Value Ref Range    Magnesium 1.7 1.6 - 2.6 mg/dL           Lab Results   Component Value Date/Time    Glucose 83 06/08/2018 05:28 AM    Glucose 146 (H) 06/07/2018 12:15 PM        Assessment/Plan:     Patient Active Problem List   Diagnosis Code    Hematuria, unspecified R31.9    Decreased libido R68.82    Impotence of organic origin 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.9    Kidney stone N20.0    Hypertrophy of prostate with urinary obstruction and other lower urinary tract symptoms (LUTS) N40.1    Premature ejaculation F52.4    Hyperaldosteronism (HCC) E26.9    Hematuria R31.9       A/P:  left adrenalectomy for Conn's Syndrome   -Control BP-d/c amlodipine; restart pts home meds cozaar 100mg daily/ nifedipine 90mg daily   -pain well controlled  -urology following   -discharge per attending-keep overnight to monitor BP monitoring   -K 3.2 order replacement held per Urology -recheck BMP am   -bowel regimen added     DVT-heparin/SCDs   GI-pepcid  Bowel-colace       [facility]  [phone]  Office:  [phone]

## 2018-06-09 VITALS
HEIGHT: 67 IN | HEART RATE: 90 BPM | SYSTOLIC BLOOD PRESSURE: 169 MMHG | WEIGHT: 148.4 LBS | OXYGEN SATURATION: 93 % | RESPIRATION RATE: 18 BRPM | TEMPERATURE: 98.9 F | DIASTOLIC BLOOD PRESSURE: 84 MMHG | BODY MASS INDEX: 23.29 KG/M2

## 2018-06-09 LAB
ANION GAP SERPL CALC-SCNC: 14 MMOL/L (ref 3–18)
BUN SERPL-MCNC: 16 MG/DL (ref 7–18)
BUN/CREAT SERPL: 13 (ref 12–20)
CALCIUM SERPL-MCNC: 8.1 MG/DL (ref 8.5–10.1)
CHLORIDE SERPL-SCNC: 106 MMOL/L (ref 100–108)
CO2 SERPL-SCNC: 22 MMOL/L (ref 21–32)
CREAT SERPL-MCNC: 1.24 MG/DL (ref 0.6–1.3)
GLUCOSE SERPL-MCNC: 81 MG/DL (ref 74–99)
POTASSIUM SERPL-SCNC: 3.6 MMOL/L (ref 3.5–5.5)
SODIUM SERPL-SCNC: 142 MMOL/L (ref 136–145)

## 2018-06-09 PROCEDURE — 80048 BASIC METABOLIC PNL TOTAL CA: CPT | Performed by: NURSE PRACTITIONER

## 2018-06-09 PROCEDURE — 74011250637 HC RX REV CODE- 250/637: Performed by: NURSE PRACTITIONER

## 2018-06-09 PROCEDURE — 74011250637 HC RX REV CODE- 250/637: Performed by: UROLOGY

## 2018-06-09 PROCEDURE — 36415 COLL VENOUS BLD VENIPUNCTURE: CPT | Performed by: NURSE PRACTITIONER

## 2018-06-09 PROCEDURE — 74011250637 HC RX REV CODE- 250/637: Performed by: HOSPITALIST

## 2018-06-09 RX ADMIN — LOSARTAN POTASSIUM 100 MG: 50 TABLET ORAL at 09:56

## 2018-06-09 RX ADMIN — NIFEDIPINE 90 MG: 90 TABLET, FILM COATED, EXTENDED RELEASE ORAL at 09:56

## 2018-06-09 RX ADMIN — EPLERENONE 50 MG: 25 TABLET ORAL at 09:56

## 2018-06-09 RX ADMIN — OXYCODONE HYDROCHLORIDE AND ACETAMINOPHEN 1 TABLET: 5; 325 TABLET ORAL at 09:56

## 2018-06-09 RX ADMIN — DOCUSATE SODIUM 100 MG: 100 CAPSULE, LIQUID FILLED ORAL at 09:56

## 2018-06-09 NOTE — PROGRESS NOTES
Patient with home BP cuff, will check BP daily. F/u next week for BMP, BP check.   If ok with medical team will send home today    Yaima Christianson MD  [facility] [place]  [address] [facility], [address] [facility]  Phone: [phone]  Pager: [phone]

## 2018-06-09 NOTE — DISCHARGE INSTRUCTIONS
DISCHARGE SUMMARY from Nurse    PATIENT INSTRUCTIONS:    After general anesthesia or intravenous sedation, for 24 hours or while taking prescription Narcotics:  · Limit your activities  · Do not drive and operate hazardous machinery  · Do not make important personal or business decisions  · Do  not drink alcoholic beverages  · If you have not urinated within 8 hours after discharge, please contact your surgeon on call. Report the following to your surgeon:  · Excessive pain, swelling, redness or odor of or around the surgical area  · Temperature over 100.5  · Nausea and vomiting lasting longer than 4 hours or if unable to take medications  · Any signs of decreased circulation or nerve impairment to extremity: change in color, persistent  numbness, tingling, coldness or increase pain  · Any questions    What to do at Home:  Recommended activity: Activity as tolerated and no driving for today and Ambulate in house,     If you experience any of the following symptoms like increasing pain  , please follow up with Dr Pilar Waterman  . *  Please give a list of your current medications to your Primary Care Provider. *  Please update this list whenever your medications are discontinued, doses are      changed, or new medications (including over-the-counter products) are added. *  Please carry medication information at all times in case of emergency situations. These are general instructions for a healthy lifestyle:    No smoking/ No tobacco products/ Avoid exposure to second hand smoke  Surgeon General's Warning:  Quitting smoking now greatly reduces serious risk to your health.     Obesity, smoking, and sedentary lifestyle greatly increases your risk for illness    A healthy diet, regular physical exercise & weight monitoring are important for maintaining a healthy lifestyle    You may be retaining fluid if you have a history of heart failure or if you experience any of the following symptoms:  Weight gain of 3 pounds or more overnight or 5 pounds in a week, increased swelling in our hands or feet or shortness of breath while lying flat in bed. Please call your doctor as soon as you notice any of these symptoms; do not wait until your next office visit. Recognize signs and symptoms of STROKE:    F-face looks uneven    A-arms unable to move or move unevenly    S-speech slurred or non-existent    T-time-call 911 as soon as signs and symptoms begin-DO NOT go       Back to bed or wait to see if you get better-TIME IS BRAIN. Warning Signs of HEART ATTACK     Call 911 if you have these symptoms:   Chest discomfort. Most heart attacks involve discomfort in the center of the chest that lasts more than a few minutes, or that goes away and comes back. It can feel like uncomfortable pressure, squeezing, fullness, or pain.  Discomfort in other areas of the upper body. Symptoms can include pain or discomfort in one or both arms, the back, neck, jaw, or stomach.  Shortness of breath with or without chest discomfort.  Other signs may include breaking out in a cold sweat, nausea, or lightheadedness. Don't wait more than five minutes to call 911 - MINUTES MATTER! Fast action can save your life. Calling 911 is almost always the fastest way to get lifesaving treatment. Emergency Medical Services staff can begin treatment when they arrive -- up to an hour sooner than if someone gets to the hospital by car. Patient armband removed and shredded  MyChart Activation    Thank you for requesting access to [employer]. Please follow the instructions below to securely access and download your online medical record. [employer] allows you to send messages to your doctor, view your test results, renew your prescriptions, schedule appointments, and more. How Do I Sign Up? 1. In your internet browser, go to [email or web address]  2. Click on the First Time User? Click Here link in the Sign In box.  You will be redirect to the New Member Sign Up page. 3. Enter your [employer] Access Code exactly as it appears below. You will not need to use this code after youve completed the sign-up process. If you do not sign up before the expiration date, you must request a new code. [employer] Access Code: [email or web address]  Expires: 2018  4:36 PM (This is the date your [employer] access code will )    4. Enter the last four digits of your Social Security Number (xxxx) and Date of Birth (mm/dd/yyyy) as indicated and click Submit. You will be taken to the next sign-up page. 5. Create a [employer] ID. This will be your [employer] login ID and cannot be changed, so think of one that is secure and easy to remember. 6. Create a [employer] password. You can change your password at any time. 7. Enter your Password Reset Question and Answer. This can be used at a later time if you forget your password. 8. Enter your e-mail address. You will receive e-mail notification when new information is available in [address] 9. Click Sign Up. You can now view and download portions of your medical record. 10. Click the Download Summary menu link to download a portable copy of your medical information. Additional Information    If you have questions, please visit the Frequently Asked Questions section of the [employer] website at [email or web address] Remember, [employer] is NOT to be used for urgent needs. For medical emergencies, dial 911. The discharge information has been reviewed with the patient and spouse. The patient and spouse verbalized understanding. Discharge medications reviewed with the patient and spouse and appropriate educational materials and side effects teaching were provided.   ___________________________________________________________________________________________________________________________________

## 2018-06-09 NOTE — PROGRESS NOTES
Bedside and verbal report given to Janeth Yee RN, with use of kardex. Rounded on pt Q1 hour throughout shift. No s/s distress nor discomfort noted, call bell in reach.

## 2018-06-09 NOTE — DISCHARGE SUMMARY
Discharge Summary    Patient: Thelma Sherwood               Sex: male          DOA: 6/7/2018         YOB: 1950      Age:  74 y.o.        LOS:  LOS: 2 days                Admit Date: 6/7/2018    Discharge Date: 6/9/2018    Admission Diagnoses: hyperaldosterdrism ([facility] hematuria,unspecified type; Hyperal*    Discharge Diagnoses:    Problem List as of 6/9/2018  Date Reviewed: 6/7/2018          Codes Class Noted - Resolved    * (Principal)Hyperaldosteronism ([facility] ICD-10-CM: E26.9  ICD-9-CM: 255.10  6/7/2018 - Present        Hematuria ICD-10-CM: R31.9  ICD-9-CM: 599.70  6/7/2018 - Present        Premature ejaculation ICD-10-CM: F52.4  ICD-9-CM: 302.75  6/10/2015 - Present        Hematuria, unspecified ICD-10-CM: R31.9  ICD-9-CM: 599.70  3/14/2012 - Present        Decreased libido ICD-10-CM: R68.82  ICD-9-CM: 799.81  3/14/2012 - Present        Impotence of organic origin ICD-10-CM: N52.9  ICD-9-CM: 607.84  3/14/2012 - Present        Kidney stone ICD-10-CM: N20.0  ICD-9-CM: 592.0  3/14/2012 - Present        Hypertrophy of prostate with urinary obstruction and other lower urinary tract symptoms (LUTS) ICD-10-CM: N40.1  ICD-9-CM: 600.01  3/14/2012 - Present              Discharge Condition: Good    Hospital Course: Thelma Sherwood was admitted postoperatively following a left lap adrenalectomy. He continued to have hypertension and GIM was consulted to help manage. Asymptomatic. Voiding, ambulating and doing well overall, ready for DC. If GIM able to control BP adequately this am anticipate DC home later today    Consults: Internal Medicine    Significant Diagnostic Studies: none    Discharge Medications:     BP meds to be adjusted by GIM*  Current Discharge Medication List      START taking these medications    Details   docusate sodium (COLACE) 100 mg capsule Take 1 Cap by mouth two (2) times a day for 15 days.   Qty: 30 Cap, Refills: 0      oxyCODONE-acetaminophen (PERCOCET) 5-325 mg per tablet Take 1-2 Tabs by mouth every four (4) hours as needed for Pain. Max Daily Amount: 12 Tabs. Qty: 30 Tab, Refills: 0         CONTINUE these medications which have NOT CHANGED    Details   guanFACINE ER (INTUNIV) 2 mg ER tablet Take 2 mg by mouth daily. cholecalciferol, VITAMIN D3, (VITAMIN D3) 5,000 unit tab tablet Take 5,000 Units by mouth daily. potassium chloride (K-DUR, KLOR-CON) 20 mEq tablet Take 20 mEq by mouth daily. eplerenone (INSPRA) 25 mg tablet Take 75 mg by mouth two (2) times a day. Refills: 5      NIFEdipine CR (ADALAT CC) 60 mg CR tablet Take 90 mg by mouth daily. losartan (COZAAR) 50 mg tablet Take 100 mg by mouth daily. VITAMIN B COMPLEX PO Take  by mouth.        multivitamin (ONE A DAY) tablet Take 1 Tab by mouth daily.                Activity: No lifting, Driving, or Strenuous exercise until follow up    Diet: Regular Diet    Wound Care: Keep wound clean and dry    Follow-up: as scheduled

## 2018-06-09 NOTE — PROGRESS NOTES
Care Management Interventions  PCP Verified by CM: Yes (May 24th 2018)  Palliative Care Criteria Met (RRAT>21 & CHF Dx)?: No  Mode of Transport at Discharge:  Other (see comment) (private vehicle with wife)  Transition of Care Consult (CM Consult): Discharge Planning  MyChart Signup: No  Discharge Durable Medical Equipment: No  Physical Therapy Consult: No  Occupational Therapy Consult: No  Speech Therapy Consult: No  Current Support Network: Lives with Spouse  Confirm Follow Up Transport: Family  Plan discussed with Pt/Family/Caregiver: Yes   Resource Information Provided?: No  Discharge Location  Discharge Placement: Home

## 2018-06-09 NOTE — PROGRESS NOTES
Internal Medicine Progress Note    Patient's Name: Charly Goode  Admit Date: 6/7/2018  Length of Stay: 2      Assessment/Plan     Active Hospital Problems    Diagnosis Date Noted    Essential hypertension 06/09/2018    Hyperaldosteronism ([address] 06/07/2018    Hematuria 06/07/2018     - Diet and mobilization per primary team  - Pain control PRN  - BP in better range today  - Suspect it will continue to improve w/ home meds  - Cont to monitor in outpt setting  - OK for d/c from medicine standpoint  - DVT protocol    I have personally reviewed all pertinent labs and films that have officially resulted over the last 24 hours. I have personally checked for all pending labs that are awaiting final results. Subjective     Pt s/e @ bedside  No major events overnight  Pt offers no complaints this AM  Denies CP or SOB    Objective     Visit Vitals    /84 (BP 1 Location: Left arm, BP Patient Position: At rest)    Pulse 90    Temp 98.9 °F (37.2 °C)    Resp 18    Ht 5' 7\" (1.702 m)    Wt 67.3 kg (148 lb 6.4 oz)    SpO2 93%    BMI 23.24 kg/m2       Physical Exam:  General Appearance: NAD, conversant  Lungs: CTA with normal respiratory effort  CV: RRR, no m/r/g  Abdomen: soft, non-tender, normal bowel sounds  Extremities: no cyanosis, no peripheral edema  Neuro: No focal deficits, motor/sensory intact    Lab/Data Reviewed:  BMP:   Lab Results   Component Value Date/Time     06/09/2018 04:25 AM    K 3.6 06/09/2018 04:25 AM     06/09/2018 04:25 AM    CO2 22 06/09/2018 04:25 AM    AGAP 14 06/09/2018 04:25 AM    GLU 81 06/09/2018 04:25 AM    BUN 16 06/09/2018 04:25 AM    CREA 1.24 06/09/2018 04:25 AM    GFRAA >60 06/09/2018 04:25 AM    GFRNA 59 (L) 06/09/2018 04:25 AM     CBC: No results found for: WBC, HGB, HGBEXT, HCT, HCTEXT, PLT, PLTEXT, HGBEXT, HCTEXT, PLTEXT    Imaging Reviewed:  No results found.     Medications Reviewed:  Current Facility-Administered Medications   Medication Dose Route Frequency    metoprolol (LOPRESSOR) injection 5 mg  5 mg IntraVENous Q6H PRN    lactated Ringers infusion  100 mL/hr IntraVENous CONTINUOUS    NIFEdipine ER (PROCARDIA XL) tablet 90 mg  90 mg Oral DAILY    losartan (COZAAR) tablet 100 mg  100 mg Oral DAILY    docusate sodium (COLACE) capsule 100 mg  100 mg Oral BID    bisacodyl (DULCOLAX) suppository 10 mg  10 mg Rectal DAILY PRN    eplerenone (INSPRA) tablet 50 mg  50 mg Oral BID    sodium chloride (NS) flush 5-10 mL  5-10 mL IntraVENous Q8H    sodium chloride (NS) flush 5-10 mL  5-10 mL IntraVENous PRN    sodium chloride (NS) flush 5-10 mL  5-10 mL IntraVENous Q8H    sodium chloride (NS) flush 5-10 mL  5-10 mL IntraVENous PRN    acetaminophen (TYLENOL) tablet 650 mg  650 mg Oral Q4H PRN    oxyCODONE-acetaminophen (PERCOCET) 5-325 mg per tablet 1 Tab  1 Tab Oral Q4H PRN    HYDROmorphone (DILAUDID) injection 1 mg  1 mg IntraVENous Q4H PRN    naloxone (NARCAN) injection 0.4 mg  0.4 mg IntraVENous PRN    oxybutynin (DITROPAN) tablet 5 mg  5 mg Oral Q6H PRN    diphenhydrAMINE (BENADRYL) injection 12.5 mg  12.5 mg IntraVENous Q4H PRN    ondansetron (ZOFRAN) injection 4 mg  4 mg IntraVENous Q4H PRN    heparin (porcine) injection 5,000 Units  5,000 Units SubCUTAneous Q8H    hydrALAZINE (APRESOLINE) 20 mg/mL injection 20 mg  20 mg IntraVENous Q6H PRN    famotidine (PF) (PEPCID) 20 mg in sodium chloride 0.9% 10 mL injection  20 mg IntraVENous Q24H     Current Outpatient Prescriptions   Medication Sig    guanFACINE ER (INTUNIV) 2 mg ER tablet Take 2 mg by mouth daily.  docusate sodium (COLACE) 100 mg capsule Take 1 Cap by mouth two (2) times a day for 15 days.  oxyCODONE-acetaminophen (PERCOCET) 5-325 mg per tablet Take 1-2 Tabs by mouth every four (4) hours as needed for Pain. Max Daily Amount: 12 Tabs.  cholecalciferol, VITAMIN D3, (VITAMIN D3) 5,000 unit tab tablet Take 5,000 Units by mouth daily.     potassium chloride (K-DUR, KLOR-CON) 20 mEq tablet Take 20 mEq by mouth daily.  eplerenone (INSPRA) 25 mg tablet Take 75 mg by mouth two (2) times a day.  NIFEdipine CR (ADALAT CC) 60 mg CR tablet Take 90 mg by mouth daily.  losartan (COZAAR) 50 mg tablet Take 100 mg by mouth daily.  VITAMIN B COMPLEX PO Take  by mouth.  multivitamin (ONE A DAY) tablet Take 1 Tab by mouth daily.              Danette Casas, DO  Internal Medicine, Hospitalist  Pager: [phone] [facility]

## 2018-06-12 NOTE — ANCILLARY DISCHARGE INSTRUCTIONS
[facility]  Discharge Phone Call       After-Care Discharge Phone Call Questions: no answer    Were you able to get your prescriptions filled? Comment:      [] Yes  []No    Comment if answer is \"No\"   Are you taking your medication(s) as your doctor ordered? Do you understand the purpose of your medications? Comment:    [] Yes  []No    Comment if answer is \"No\"   Are you taking any other medications that are not on the list?  Comment:      [] Yes  []No    Comment if answer is \"Yes\"   Do you have any questions about your medications? Are you aware of potential side effects? Comment:    [] Yes  []No    Comment if answer is \"Yes\"   Did you make your follow-up appointments (if the hospital did not do this before  discharge)? Comment:    [] Yes  []No    Comment if answer is \"No\"   Is there any reason you might not be able to keep your follow-up appointments? Comment:     [] Yes  []No    Comment if answer is \"Yes\"   Do you have any questions about your care plan? Are you aware of what health problems to be alert for? Comment:    [] Yes  []No    Comment if answer is \"Yes\"   Do you have a good understanding of how you should manage your health? Comment:    [] Yes  []No    Comment if answer is \"Yes\"   Do you know which symptoms to watch for that would mean you would need to call your doctor right away? Comment:      [] Yes  []No    Comment if answer is \"No\"   Do you have any questions about the follow up process or any instructions that we have provided? Comment:    [] Yes  []No    Comment if answer is \"Yes\"   Did staff take your preferences into account?         [] Yes  []No    Comment if answer is \"Yes\"

## (undated) DEVICE — STAPLER SKIN H3.9MM WIRE DIA0.58MM CRWN 6.9MM 35 STPL FIX

## (undated) DEVICE — SUTURE SZ 0 27IN 5/8 CIR UR-6  TAPER PT VIOLET ABSRB VICRYL J603H

## (undated) DEVICE — SOL INJ SOD CL 0.9% 1000ML BG --

## (undated) DEVICE — LUB GEL SURG SURGLUB 4.25OZ --

## (undated) DEVICE — SLEEVE TRCR L100MM DIA5MM UNIV STBL FOR BLDELSS DIL TIP

## (undated) DEVICE — CLIP LIG ABSRB HEM-LOK LG PUR --

## (undated) DEVICE — 3M™ DURAPORE™ SURGICAL TAPE 1538-0, 1/2 INCH X 10 YARD (1,25CM X 9,1M), 24 ROLLS/BOX: Brand: 3M™ DURAPORE™

## (undated) DEVICE — OBTRTR BLDELSS 8MM DISP -- DA VINCI - SNGL USE

## (undated) DEVICE — DERMABOND SKIN ADH 0.7ML -- DERMABOND ADVANCED 12/BX

## (undated) DEVICE — DRAPE KIT ACCS 4-ARM DISP -- DA VINCI

## (undated) DEVICE — SOLUTION IV 1000ML 0.9% SOD CHL

## (undated) DEVICE — KENDALL SCD EXPRESS SLEEVES, KNEE LENGTH, MEDIUM: Brand: KENDALL SCD

## (undated) DEVICE — SUTURE VCRL SZ 3-0 L27IN ABSRB UD L26MM SH 1/2 CIR J416H

## (undated) DEVICE — ELECTRO LUBE IS A SINGLE PATIENT USE DEVICE THAT IS INTENDED TO BE USED ON ELECTROSURGICAL ELECTRODES TO REDUCE STICKING.: Brand: KEY SURGICAL ELECTRO LUBE

## (undated) DEVICE — SHEET,DRAPE,70X100,STERILE: Brand: MEDLINE

## (undated) DEVICE — MEDI-VAC NON-CONDUCTIVE SUCTION TUBING 6MM X 6.1M (20 FT.) L: Brand: CARDINAL HEALTH

## (undated) DEVICE — NEEDLE HYPO 25GA L1.5IN BVL ORIENTED ECLIPSE

## (undated) DEVICE — SYR 10ML CTRL LR LCK NSAF LF --

## (undated) DEVICE — TIP COVER ACCESSORY

## (undated) DEVICE — TRAY CATH OD16FR SIL URIN M STATLOK STBL DEV SURSTP

## (undated) DEVICE — CARTRIDGE CLP LIG HEMLOK GRN --

## (undated) DEVICE — STERILE POLYISOPRENE POWDER-FREE SURGICAL GLOVES: Brand: PROTEXIS

## (undated) DEVICE — BLADE ASSEMB CLP HAIR FINE --

## (undated) DEVICE — Device

## (undated) DEVICE — SUTURE VCRL SZ 0 L36IN ABSRB UD L36MM CT-1 1/2 CIR J946H

## (undated) DEVICE — CLIP APPLIER WITH CLIP LOGIC TECHNOLOGY: Brand: ENDO CLIP III

## (undated) DEVICE — FLOSEAL HEMOSTATIC MATRIX, 10 ML: Brand: FLOSEAL

## (undated) DEVICE — (D)PREP SKN CHLRAPRP APPL 26ML -- CONVERT TO ITEM 371833

## (undated) DEVICE — UNIVERSAL STAPLER: Brand: ENDO GIA ULTRA

## (undated) DEVICE — INSTRMT SET WND CLSR SUT PASS --

## (undated) DEVICE — SUTURE PDS II SZ 0 L27IN ABSRB VLT L36MM CT-1 1/2 CIR Z340H

## (undated) DEVICE — UNIVERSAL FIXATION CANNULA: Brand: VERSAPORT

## (undated) DEVICE — VISUALIZATION SYSTEM: Brand: CLEARIFY

## (undated) DEVICE — REM POLYHESIVE ADULT PATIENT RETURN ELECTRODE: Brand: VALLEYLAB

## (undated) DEVICE — PACKING 8004007 NEURAY 200PK 13X76MM: Brand: NEURAY ®

## (undated) DEVICE — BODY ALIGNER: Brand: DEROYAL

## (undated) DEVICE — SUTURE MCRYL SZ 4-0 L27IN ABSRB UD SH L26MM 1/2 CIR Y415H

## (undated) DEVICE — STANDARD HYPODERMIC NEEDLE,ALUMINUM HUB: Brand: MONOJECT

## (undated) DEVICE — APPLICATOR SEAL FLOSEAL 5MM+ --

## (undated) DEVICE — TROCAR ENDOSCP L100MM DIA12MM STBL SL BLDELSS ENDOPATH XCEL

## (undated) DEVICE — DISPOSABLE SUCTION/IRRIGATOR TUBE SET WITH TIP: Brand: AHTO

## (undated) DEVICE — CURVED TIP ARTICULATING RELOAD WITH TRI-STAPLE TECHNOLOGY: Brand: ENDO GIA

## (undated) DEVICE — DEVON™ KNEE AND BODY STRAP 60" X 3" (1.5 M X 7.6 CM): Brand: DEVON